# Patient Record
Sex: FEMALE | Race: OTHER | Employment: OTHER | URBAN - METROPOLITAN AREA
[De-identification: names, ages, dates, MRNs, and addresses within clinical notes are randomized per-mention and may not be internally consistent; named-entity substitution may affect disease eponyms.]

---

## 2017-01-18 ENCOUNTER — OFFICE VISIT (OUTPATIENT)
Dept: INTERNAL MEDICINE CLINIC | Facility: CLINIC | Age: 77
End: 2017-01-18

## 2017-01-18 VITALS
HEART RATE: 62 BPM | DIASTOLIC BLOOD PRESSURE: 72 MMHG | BODY MASS INDEX: 23.19 KG/M2 | SYSTOLIC BLOOD PRESSURE: 138 MMHG | WEIGHT: 126 LBS | RESPIRATION RATE: 16 BRPM | HEIGHT: 62 IN

## 2017-01-18 DIAGNOSIS — I10 ESSENTIAL HYPERTENSION WITH GOAL BLOOD PRESSURE LESS THAN 130/85: ICD-10-CM

## 2017-01-18 DIAGNOSIS — E11.9 TYPE 2 DIABETES MELLITUS WITHOUT COMPLICATION, WITHOUT LONG-TERM CURRENT USE OF INSULIN (HCC): Primary | ICD-10-CM

## 2017-01-18 DIAGNOSIS — E78.2 MIXED HYPERLIPIDEMIA: ICD-10-CM

## 2017-01-18 PROCEDURE — G0463 HOSPITAL OUTPT CLINIC VISIT: HCPCS | Performed by: INTERNAL MEDICINE

## 2017-01-18 PROCEDURE — 99214 OFFICE O/P EST MOD 30 MIN: CPT | Performed by: INTERNAL MEDICINE

## 2017-01-18 NOTE — PROGRESS NOTES
HPI:   1. Type 2 diabetes mellitus without complication, without long-term current use of insulin ([facility] [address]    Mary Bloom is a 78year old female who presents for recheck of her diabetes. Patient’s FBS have been good.  Last visit with ophthalmologist was 9/2 Value   09/26/2015 80   05/20/2014 66   08/22/2012 86   ----------  AST (U/L)   Date Value   10/09/2016 20   ----------  AST (SGOT) (P) (U/L)   Date Value   09/26/2015 17   05/20/2014 23   08/22/2012 21   ----------  ALT (U/L)   Date Value   10/09/2016 13* INNER EAR SURGERY PROC UNLISTED      Comment mastoidectomy      Social History:   Smoking Status: Former Smoker                   Packs/Day: 1.00  Years:           Types: Cigarettes      Quit date: 01/01/1985    Alcohol Use: Yes                Comment: 1 g exercise at least 3 times weekly will help to curb one's appetite, control weight and lead to better blood pressure control.     3. Mixed hyperlipidemia    Patient instructed to take cholesterol lowering medications as prescribed and to continue to follow a

## 2017-01-23 RX ORDER — DILTIAZEM HYDROCHLORIDE 240 MG/1
CAPSULE, EXTENDED RELEASE ORAL
Qty: 90 CAPSULE | Refills: 0 | Status: SHIPPED | OUTPATIENT
Start: 2017-01-23 | End: 2017-04-19

## 2017-01-23 RX ORDER — PRAVASTATIN SODIUM 20 MG
TABLET ORAL
Qty: 90 TABLET | Refills: 0 | Status: SHIPPED | OUTPATIENT
Start: 2017-01-23 | End: 2017-04-19

## 2017-01-23 RX ORDER — LISINOPRIL 20 MG/1
TABLET ORAL
Qty: 90 TABLET | Refills: 0 | Status: SHIPPED | OUTPATIENT
Start: 2017-01-23 | End: 2017-04-19

## 2017-01-23 RX ORDER — SITAGLIPTIN AND METFORMIN HYDROCHLORIDE 50; 1000 MG/1; MG/1
TABLET, FILM COATED ORAL
Qty: 180 TABLET | Refills: 0 | Status: SHIPPED | OUTPATIENT
Start: 2017-01-23 | End: 2017-04-19

## 2017-04-19 ENCOUNTER — OFFICE VISIT (OUTPATIENT)
Dept: INTERNAL MEDICINE CLINIC | Facility: CLINIC | Age: 77
End: 2017-04-19

## 2017-04-19 VITALS
WEIGHT: 125 LBS | BODY MASS INDEX: 23 KG/M2 | HEART RATE: 67 BPM | RESPIRATION RATE: 16 BRPM | SYSTOLIC BLOOD PRESSURE: 138 MMHG | HEIGHT: 62 IN | DIASTOLIC BLOOD PRESSURE: 66 MMHG

## 2017-04-19 DIAGNOSIS — E78.2 MIXED HYPERLIPIDEMIA: ICD-10-CM

## 2017-04-19 DIAGNOSIS — I10 ESSENTIAL HYPERTENSION WITH GOAL BLOOD PRESSURE LESS THAN 130/85: ICD-10-CM

## 2017-04-19 DIAGNOSIS — J43.2 CENTRILOBULAR EMPHYSEMA (HCC): ICD-10-CM

## 2017-04-19 DIAGNOSIS — E11.9 TYPE 2 DIABETES MELLITUS WITHOUT COMPLICATION, WITHOUT LONG-TERM CURRENT USE OF INSULIN (HCC): Primary | ICD-10-CM

## 2017-04-19 PROCEDURE — 99214 OFFICE O/P EST MOD 30 MIN: CPT | Performed by: INTERNAL MEDICINE

## 2017-04-19 PROCEDURE — G0463 HOSPITAL OUTPT CLINIC VISIT: HCPCS | Performed by: INTERNAL MEDICINE

## 2017-04-19 RX ORDER — LISINOPRIL 20 MG/1
20 TABLET ORAL
Qty: 90 TABLET | Refills: 3 | Status: ON HOLD | OUTPATIENT
Start: 2017-04-19 | End: 2018-04-13

## 2017-04-19 RX ORDER — PRAVASTATIN SODIUM 20 MG
20 TABLET ORAL NIGHTLY
Qty: 90 TABLET | Refills: 3 | Status: SHIPPED | OUTPATIENT
Start: 2017-04-19 | End: 2018-06-07

## 2017-04-19 RX ORDER — DILTIAZEM HYDROCHLORIDE 240 MG/1
240 CAPSULE, COATED, EXTENDED RELEASE ORAL
Qty: 90 CAPSULE | Refills: 3 | Status: SHIPPED | OUTPATIENT
Start: 2017-04-19 | End: 2018-05-04

## 2017-04-19 RX ORDER — GLIMEPIRIDE 2 MG/1
4 TABLET ORAL
Qty: 90 TABLET | Refills: 3 | Status: ON HOLD | OUTPATIENT
Start: 2017-04-19 | End: 2017-06-18

## 2017-04-19 NOTE — PROGRESS NOTES
HPI:   1. Type 2 diabetes mellitus without complication, without long-term current use of insulin ([facility] [address]    Rasheed Luna is a 78year old female who presents for recheck of her diabetes. Patient’s FBS have been good.  Last visit with ophthalmologist was 9/2 05/20/2014 66   08/22/2012 86   ----------  AST (U/L)   Date Value   10/09/2016 20   09/26/2015 17   05/20/2014 23   08/22/2012 21   ----------  ALT (U/L)   Date Value   10/09/2016 13*   09/26/2015 15   05/20/2014 16   08/22/2012 17   ----------   No res otherwise  SKIN: denies any unusual skin lesions or rashes  RESPIRATORY: denies shortness of breath with exertion  CARDIOVASCULAR: denies chest pain on exertion  GI: denies abdominal pain and denies heartburn  NEURO: denies headaches    EXAM:   /66 m weight. 4. Centrilobular emphysema ([address]    Has continued Chronic Obstructive Pulmonary Disease. (COPD). Coughs in AM most days. Has been using inhalers as directed and has been getting relief. Advised to rinse mouth after using any inhalers with steroid

## 2017-05-12 RX ORDER — FLUTICASONE PROPIONATE AND SALMETEROL 50; 250 UG/1; UG/1
POWDER RESPIRATORY (INHALATION)
Qty: 60 EACH | Refills: 2 | Status: ON HOLD | OUTPATIENT
Start: 2017-05-12 | End: 2018-04-16

## 2017-05-12 NOTE — TELEPHONE ENCOUNTER
Refill Protocol Appointment Criteria  · Appointment scheduled in the past 6 months or in the next 3 months  Recent Visits       Provider Department Primary Dx    3 weeks ago Aarti Hernandez MD [facility], [facility], [facility] [place] Type 2 diabetes [facility]

## 2017-06-18 ENCOUNTER — HOSPITAL ENCOUNTER (INPATIENT)
Facility: HOSPITAL | Age: 77
LOS: 2 days | Discharge: HOME | DRG: 190 | End: 2017-06-20
Attending: EMERGENCY MEDICINE | Admitting: HOSPITALIST
Payer: MEDICARE

## 2017-06-18 ENCOUNTER — APPOINTMENT (OUTPATIENT)
Dept: GENERAL RADIOLOGY | Facility: HOSPITAL | Age: 77
DRG: 190 | End: 2017-06-18
Attending: EMERGENCY MEDICINE
Payer: MEDICARE

## 2017-06-18 DIAGNOSIS — J45.901 ASTHMA EXACERBATION: ICD-10-CM

## 2017-06-18 DIAGNOSIS — R09.02 HYPOXIA: Primary | ICD-10-CM

## 2017-06-18 DIAGNOSIS — R77.8 ELEVATED TROPONIN: ICD-10-CM

## 2017-06-18 PROBLEM — R79.89 ELEVATED TROPONIN: Status: ACTIVE | Noted: 2017-06-18

## 2017-06-18 LAB
ANION GAP SERPL CALC-SCNC: 11 MMOL/L (ref 0–18)
BASOPHILS # BLD: 0 K/UL (ref 0–0.2)
BASOPHILS NFR BLD: 0 %
BUN SERPL-MCNC: 14 MG/DL (ref 8–20)
BUN/CREAT SERPL: 21.5 (ref 10–20)
CALCIUM SERPL-MCNC: 9.6 MG/DL (ref 8.5–10.5)
CHLORIDE SERPL-SCNC: 98 MMOL/L (ref 95–110)
CHOLEST SERPL-MCNC: 134 MG/DL (ref 110–200)
CO2 SERPL-SCNC: 29 MMOL/L (ref 22–32)
CREAT SERPL-MCNC: 0.65 MG/DL (ref 0.5–1.5)
EOSINOPHIL # BLD: 0 K/UL (ref 0–0.7)
EOSINOPHIL NFR BLD: 0 %
ERYTHROCYTE [DISTWIDTH] IN BLOOD BY AUTOMATED COUNT: 13.1 % (ref 11–15)
GLUCOSE BLDC GLUCOMTR-MCNC: 352 MG/DL (ref 70–99)
GLUCOSE SERPL-MCNC: 295 MG/DL (ref 70–99)
HCT VFR BLD AUTO: 40.3 % (ref 35–48)
HDLC SERPL-MCNC: 72 MG/DL
HGB BLD-MCNC: 13.4 G/DL (ref 12–16)
LDLC SERPL CALC-MCNC: 51 MG/DL (ref 0–99)
LYMPHOCYTES # BLD: 0.7 K/UL (ref 1–4)
LYMPHOCYTES NFR BLD: 5 %
MAGNESIUM SERPL-MCNC: 1.7 MG/DL (ref 1.8–2.5)
MCH RBC QN AUTO: 31.4 PG (ref 27–32)
MCHC RBC AUTO-ENTMCNC: 33.2 G/DL (ref 32–37)
MCV RBC AUTO: 94.5 FL (ref 80–100)
MONOCYTES # BLD: 0.9 K/UL (ref 0–1)
MONOCYTES NFR BLD: 7 %
NEUTROPHILS # BLD AUTO: 10.7 K/UL (ref 1.8–7.7)
NEUTROPHILS NFR BLD: 87 %
NONHDLC SERPL-MCNC: 62 MG/DL
OSMOLALITY UR CALC.SUM OF ELEC: 297 MOSM/KG (ref 275–295)
PLATELET # BLD AUTO: 169 K/UL (ref 140–400)
PMV BLD AUTO: 9.5 FL (ref 7.4–10.3)
POTASSIUM SERPL-SCNC: 4.2 MMOL/L (ref 3.3–5.1)
RBC # BLD AUTO: 4.27 M/UL (ref 3.7–5.4)
SODIUM SERPL-SCNC: 138 MMOL/L (ref 136–144)
TRIGL SERPL-MCNC: 56 MG/DL (ref 1–149)
TROPONIN I SERPL-MCNC: 0.09 NG/ML (ref ?–0.03)
TROPONIN I SERPL-MCNC: 0.1 NG/ML (ref ?–0.03)
WBC # BLD AUTO: 12.3 K/UL (ref 4–11)

## 2017-06-18 PROCEDURE — 71010 XR CHEST AP PORTABLE  (CPT=71010): CPT | Performed by: EMERGENCY MEDICINE

## 2017-06-18 PROCEDURE — 99223 1ST HOSP IP/OBS HIGH 75: CPT | Performed by: HOSPITALIST

## 2017-06-18 PROCEDURE — 99222 1ST HOSP IP/OBS MODERATE 55: CPT | Performed by: INTERNAL MEDICINE

## 2017-06-18 PROCEDURE — 3E0F73Z INTRODUCTION OF ANTI-INFLAMMATORY INTO RESPIRATORY TRACT, VIA NATURAL OR ARTIFICIAL OPENING: ICD-10-PCS | Performed by: HOSPITALIST

## 2017-06-18 RX ORDER — METHYLPREDNISOLONE SODIUM SUCCINATE 40 MG/ML
40 INJECTION, POWDER, LYOPHILIZED, FOR SOLUTION INTRAMUSCULAR; INTRAVENOUS ONCE
Status: COMPLETED | OUTPATIENT
Start: 2017-06-18 | End: 2017-06-18

## 2017-06-18 RX ORDER — METHYLPREDNISOLONE SODIUM SUCCINATE 40 MG/ML
40 INJECTION, POWDER, LYOPHILIZED, FOR SOLUTION INTRAMUSCULAR; INTRAVENOUS EVERY 8 HOURS
Status: DISCONTINUED | OUTPATIENT
Start: 2017-06-18 | End: 2017-06-19

## 2017-06-18 RX ORDER — ALBUTEROL SULFATE 2.5 MG/3ML
2.5 SOLUTION RESPIRATORY (INHALATION) CONTINUOUS
Status: DISCONTINUED | OUTPATIENT
Start: 2017-06-18 | End: 2017-06-18

## 2017-06-18 RX ORDER — SODIUM PHOSPHATE, DIBASIC AND SODIUM PHOSPHATE, MONOBASIC 7; 19 G/133ML; G/133ML
1 ENEMA RECTAL ONCE AS NEEDED
Status: DISCONTINUED | OUTPATIENT
Start: 2017-06-18 | End: 2017-06-20

## 2017-06-18 RX ORDER — DEXTROSE MONOHYDRATE 25 G/50ML
50 INJECTION, SOLUTION INTRAVENOUS AS NEEDED
Status: DISCONTINUED | OUTPATIENT
Start: 2017-06-18 | End: 2017-06-20

## 2017-06-18 RX ORDER — BISACODYL 10 MG
10 SUPPOSITORY, RECTAL RECTAL
Status: DISCONTINUED | OUTPATIENT
Start: 2017-06-18 | End: 2017-06-20

## 2017-06-18 RX ORDER — LISINOPRIL 20 MG/1
20 TABLET ORAL
Status: DISCONTINUED | OUTPATIENT
Start: 2017-06-18 | End: 2017-06-20

## 2017-06-18 RX ORDER — AZITHROMYCIN 250 MG/1
500 TABLET, FILM COATED ORAL DAILY
Status: DISCONTINUED | OUTPATIENT
Start: 2017-06-18 | End: 2017-06-20

## 2017-06-18 RX ORDER — IPRATROPIUM BROMIDE AND ALBUTEROL SULFATE 2.5; .5 MG/3ML; MG/3ML
SOLUTION RESPIRATORY (INHALATION)
Status: DISCONTINUED
Start: 2017-06-18 | End: 2017-06-18 | Stop reason: WASHOUT

## 2017-06-18 RX ORDER — ALBUTEROL SULFATE 2.5 MG/3ML
10 SOLUTION RESPIRATORY (INHALATION) CONTINUOUS
Status: DISCONTINUED | OUTPATIENT
Start: 2017-06-18 | End: 2017-06-20

## 2017-06-18 RX ORDER — DILTIAZEM HYDROCHLORIDE 240 MG/1
240 CAPSULE, COATED, EXTENDED RELEASE ORAL
Status: DISCONTINUED | OUTPATIENT
Start: 2017-06-18 | End: 2017-06-20

## 2017-06-18 RX ORDER — POLYETHYLENE GLYCOL 3350 17 G/17G
17 POWDER, FOR SOLUTION ORAL DAILY PRN
Status: DISCONTINUED | OUTPATIENT
Start: 2017-06-18 | End: 2017-06-20

## 2017-06-18 RX ORDER — ASPIRIN 81 MG/1
81 TABLET ORAL DAILY
Status: DISCONTINUED | OUTPATIENT
Start: 2017-06-18 | End: 2017-06-20

## 2017-06-18 RX ORDER — IPRATROPIUM BROMIDE AND ALBUTEROL SULFATE 2.5; .5 MG/3ML; MG/3ML
3 SOLUTION RESPIRATORY (INHALATION) ONCE
Status: COMPLETED | OUTPATIENT
Start: 2017-06-18 | End: 2017-06-18

## 2017-06-18 RX ORDER — ENOXAPARIN SODIUM 100 MG/ML
40 INJECTION SUBCUTANEOUS DAILY
Status: DISCONTINUED | OUTPATIENT
Start: 2017-06-19 | End: 2017-06-20

## 2017-06-18 RX ORDER — ASPIRIN 81 MG/1
324 TABLET, CHEWABLE ORAL ONCE
Status: COMPLETED | OUTPATIENT
Start: 2017-06-18 | End: 2017-06-18

## 2017-06-18 RX ORDER — ACETAMINOPHEN 325 MG/1
650 TABLET ORAL EVERY 6 HOURS PRN
Status: DISCONTINUED | OUTPATIENT
Start: 2017-06-18 | End: 2017-06-20

## 2017-06-18 RX ORDER — IPRATROPIUM BROMIDE AND ALBUTEROL SULFATE 2.5; .5 MG/3ML; MG/3ML
3 SOLUTION RESPIRATORY (INHALATION)
Status: DISCONTINUED | OUTPATIENT
Start: 2017-06-18 | End: 2017-06-19

## 2017-06-18 RX ORDER — PRAVASTATIN SODIUM 20 MG
20 TABLET ORAL NIGHTLY
Status: DISCONTINUED | OUTPATIENT
Start: 2017-06-18 | End: 2017-06-20

## 2017-06-18 NOTE — CONSULTS
[facility]  Report of Consultation    Sarah Eaton Patient Status:  Emergency    1940 MRN [id number]   Location [address] Attending Tanika Blackwell MD   Hosp Day # 0 PCP Tali Bautista MD     Reason fo quit over 30 years ago. Retired pediatric RN. , lives with a cousin. Allergies:    Peanuts                     Comment:Other reaction(s):  Anaphylaxis  Penicillins                 Comment:Other reaction(s): PENICILLINS    Medications:    Juan Jose Evans HGB 13.4 06/18/2017   HCT 40.3 06/18/2017   MCV 94.5 06/18/2017   MCH 31.4 06/18/2017   MCHC 33.2 06/18/2017   RDW 13.1 06/18/2017    06/18/2017   MPV 9.5 06/18/2017     No results found for: PT, INR     . BMP normal.  Troponin 0.09.   LDL

## 2017-06-18 NOTE — ED NOTES
Respiratory called for duoneb. IV established to right [facility], #20 by Avila Bullock. 4 L 02 applied. Xray ready.

## 2017-06-18 NOTE — ED PROVIDER NOTES
Patient Seen in: [facility] AND [facility] Emergency Department    History   Patient presents with:  Dyspnea IKER SOB (respiratory)    Stated Complaint: SOB     HPI    79 yo F with PMH asthma/COPD, DM, HTN, HL presenting for evaluation of one day of worsening exert Problem Relation Age of Onset   • Heart Disease Father      CAD (cause of death at age 77)   • Infectious Disease Mother      sepsis (cause of death at age 76)         Smoking Status: Former Smoker                   Packs/Day: 1.00  Years:           Type other components within normal limits   MAGNESIUM - Abnormal; Notable for the following:     Magnesium 1.7 (*)     All other components within normal limits   TROPONIN I, 0 HOUR - Abnormal; Notable for the following:     Troponin 0.09 (*)     All other com LUNGS/PLEURA: Pulmonary hyperinflation with emphysematous changes in the upper lobes. No focal pulmonary opacity or pleural effusion. BONES: No fracture or visible bony lesion. OTHER: Negative.    Dictated by (CST): Adwoa Palomino MD on 6/18/2017 at 16:31

## 2017-06-18 NOTE — ED NOTES
Report given to [facility] 3rd floor. ED tech at bedside for 2 hour EKG and troponin. Transport requested.

## 2017-06-18 NOTE — H&P
[address] [place] [address],4[address] Floor Patient Status:  Emergency    1940 MRN [id number]   Location [address] Attending Tula Krabbe, MD   Hosp Day # 0 PCP Nahed Narvaez MD     Jaxon Comment:Other reaction(s): PENICILLINS    (Not in a hospital admission)    Review of Systems:   Pertinent items are noted in HPI. CONSTITUTIONAL:  No weight loss, fever, chills, weakness or fatigue.   HEENT:  Eyes:  No visual loss, blurred vision, double v speech clear and coherent. Psychiatric:  Cooperative, appropriate mood & affect.     Cervical Papanicolaou to be done in MD's office    Results:     Lab Results  Component Value Date   WBC 12.3* 06/18/2017   HGB 13.4 06/18/2017   HCT 40.3 06/18/2017   PLT

## 2017-06-19 ENCOUNTER — APPOINTMENT (OUTPATIENT)
Dept: CV DIAGNOSTICS | Facility: HOSPITAL | Age: 77
DRG: 190 | End: 2017-06-19
Attending: HOSPITALIST
Payer: MEDICARE

## 2017-06-19 LAB
ANION GAP SERPL CALC-SCNC: 9 MMOL/L (ref 0–18)
BASOPHILS # BLD: 0 K/UL (ref 0–0.2)
BASOPHILS NFR BLD: 0 %
BUN SERPL-MCNC: 14 MG/DL (ref 8–20)
BUN/CREAT SERPL: 40 (ref 10–20)
CALCIUM SERPL-MCNC: 9.5 MG/DL (ref 8.5–10.5)
CHLORIDE SERPL-SCNC: 102 MMOL/L (ref 95–110)
CO2 SERPL-SCNC: 31 MMOL/L (ref 22–32)
CREAT SERPL-MCNC: 0.35 MG/DL (ref 0.5–1.5)
EOSINOPHIL # BLD: 0 K/UL (ref 0–0.7)
EOSINOPHIL NFR BLD: 0 %
ERYTHROCYTE [DISTWIDTH] IN BLOOD BY AUTOMATED COUNT: 13 % (ref 11–15)
GLUCOSE BLDC GLUCOMTR-MCNC: 136 MG/DL (ref 70–99)
GLUCOSE BLDC GLUCOMTR-MCNC: 138 MG/DL (ref 70–99)
GLUCOSE BLDC GLUCOMTR-MCNC: 277 MG/DL (ref 70–99)
GLUCOSE SERPL-MCNC: 171 MG/DL (ref 70–99)
HBA1C MFR BLD: 6.8 % (ref 4–6)
HCT VFR BLD AUTO: 36.8 % (ref 35–48)
HGB BLD-MCNC: 12.6 G/DL (ref 12–16)
LYMPHOCYTES # BLD: 0.6 K/UL (ref 1–4)
LYMPHOCYTES NFR BLD: 7 %
MAGNESIUM SERPL-MCNC: 1.8 MG/DL (ref 1.8–2.5)
MCH RBC QN AUTO: 32.1 PG (ref 27–32)
MCHC RBC AUTO-ENTMCNC: 34.4 G/DL (ref 32–37)
MCV RBC AUTO: 93.3 FL (ref 80–100)
MONOCYTES # BLD: 0.5 K/UL (ref 0–1)
MONOCYTES NFR BLD: 6 %
NEUTROPHILS # BLD AUTO: 7.2 K/UL (ref 1.8–7.7)
NEUTROPHILS NFR BLD: 86 %
OSMOLALITY UR CALC.SUM OF ELEC: 299 MOSM/KG (ref 275–295)
PLATELET # BLD AUTO: 168 K/UL (ref 140–400)
PMV BLD AUTO: 9.1 FL (ref 7.4–10.3)
POTASSIUM SERPL-SCNC: 4.2 MMOL/L (ref 3.3–5.1)
RBC # BLD AUTO: 3.94 M/UL (ref 3.7–5.4)
RESPIRATORY PANEL NEG:: NEGATIVE
SODIUM SERPL-SCNC: 142 MMOL/L (ref 136–144)
STREP PNEUMO ANTIGEN, URINE: NEGATIVE
TROPONIN I SERPL-MCNC: 0.06 NG/ML (ref ?–0.03)
WBC # BLD AUTO: 8.3 K/UL (ref 4–11)

## 2017-06-19 PROCEDURE — B24BZZZ ULTRASONOGRAPHY OF HEART WITH AORTA: ICD-10-PCS | Performed by: HOSPITALIST

## 2017-06-19 PROCEDURE — 93306 TTE W/DOPPLER COMPLETE: CPT | Performed by: HOSPITALIST

## 2017-06-19 PROCEDURE — 99232 SBSQ HOSP IP/OBS MODERATE 35: CPT | Performed by: INTERNAL MEDICINE

## 2017-06-19 PROCEDURE — 99233 SBSQ HOSP IP/OBS HIGH 50: CPT | Performed by: HOSPITALIST

## 2017-06-19 RX ORDER — METHYLPREDNISOLONE SODIUM SUCCINATE 40 MG/ML
40 INJECTION, POWDER, LYOPHILIZED, FOR SOLUTION INTRAMUSCULAR; INTRAVENOUS EVERY 12 HOURS
Status: COMPLETED | OUTPATIENT
Start: 2017-06-19 | End: 2017-06-19

## 2017-06-19 RX ORDER — PREDNISONE 20 MG/1
40 TABLET ORAL
Status: DISCONTINUED | OUTPATIENT
Start: 2017-06-20 | End: 2017-06-20

## 2017-06-19 RX ORDER — 0.9 % SODIUM CHLORIDE 0.9 %
VIAL (ML) INJECTION
Status: COMPLETED
Start: 2017-06-19 | End: 2017-06-19

## 2017-06-19 RX ORDER — MAGNESIUM OXIDE 400 MG (241.3 MG MAGNESIUM) TABLET
400 TABLET ONCE
Status: COMPLETED | OUTPATIENT
Start: 2017-06-19 | End: 2017-06-19

## 2017-06-19 RX ORDER — IPRATROPIUM BROMIDE AND ALBUTEROL SULFATE 2.5; .5 MG/3ML; MG/3ML
3 SOLUTION RESPIRATORY (INHALATION)
Status: DISCONTINUED | OUTPATIENT
Start: 2017-06-19 | End: 2017-06-20

## 2017-06-19 NOTE — PROGRESS NOTES
[facility]    Progress Note    [facility] Patient Status:  Inpatient    1940 MRN [id number]   Location [facility] 3W/SW Attending Madhav Sorto MD   Hosp Day # 1 PCP Lucille Roth MD         Assessment and Pl Subcutaneous TID CC   • insulin aspart  1-7 Units Subcutaneous TID CC   • aspirin EC  81 mg Oral Daily   • azithromycin  500 mg Oral Daily   • DilTIAZem HCl ER Coated Beads  240 mg Oral Daily   • lisinopril  20 mg Oral Daily   • Pravastatin Sodium  20 mg O

## 2017-06-19 NOTE — CONSULTS
Pulmonary/Critical Care Consult Note    HPI:   Didier Velazco is a 78year old female with Patient presents with:  Dyspnea IKER SOB (respiratory)    Donnell Tello MD    Pt is a 77 yo with asthma, COPD, remote tob abuse, HL, and HTN who presented to 40 mg 40 mg Subcutaneous Daily   acetaminophen (TYLENOL) tab 650 mg 650 mg Oral Q6H PRN   ipratropium-albuterol (DUONEB) nebulizer solution 3 mL 3 mL Nebulization Q6H WA   MethylPREDNISolone Sodium Succ (Solu-MEDROL) injection 40 mg 40 mg Intravenous Q8H (cause of death at age 78)            PHYSICAL EXAM:   /59 mmHg  Pulse 106  Temp(Src) 98.6 °F (37 °C) (Oral)  Resp 20  Ht 5' 2\" (1.575 m)  Wt 121 lb 3.2 oz (54.976 kg)  BMI 22.16 kg/m2  SpO2 92%    Intake/Output Summary (Last 24 hours) at 06/19/17 0

## 2017-06-19 NOTE — PROGRESS NOTES
Pulmonary/Critical Care Follow Up Note    HPI:   Daisha Leblanc is a 78year old female with Patient presents with:  Dyspnea IKER SOB (respiratory)      PCP Helane Lundborg, MD  Admission Attending Lawson Prieto MD    Hospital Day #1    Feeling beter Units, Subcutaneous, TID CC  •  aspirin EC tab 81 mg, 81 mg, Oral, Daily  •  azithromycin (ZITHROMAX) tab 500 mg, 500 mg, Oral, Daily  •  DilTIAZem HCl ER Coated Beads (CARDIZEM CD) 24 hr cap 240 mg, 240 mg, Oral, Daily  •  lisinopril (PRINIVIL,ZESTRIL) ta

## 2017-06-19 NOTE — PROGRESS NOTES
[facility]    Progress Note    [facility] Patient Status:  Inpatient    1940 MRN [id number]   Location [facility] 3W/SW Attending Madhav Sorto MD   Hosp Day # 1 PCP Chante Horner MD     Subjective:   J[place]anny [place] proph: heparin sq    Total time spent with >50% on patient counseling, including chart review >30 min. Dispo:   Respiratory status improved,   Appreciate recs   Will continue to monitor.        Results:     Lab Results  Component Value Date   WBC 8.3 06

## 2017-06-19 NOTE — PROGRESS NOTES
Zithromax (azithromycin) 250 mg PO q24h was ordered for Rachel Stover by Dr. Marielena Marquez. Body mass index is 24.69 kg/(m^2).   Wt Readings from Last 6 Encounters:  06/18/17 : 135 lb  04/19/17 : 125 lb  01/18/17 : 126 lb  10/10/16 : 125 lb  07/11/16 : 125 lb

## 2017-06-20 ENCOUNTER — APPOINTMENT (OUTPATIENT)
Dept: NUCLEAR MEDICINE | Facility: HOSPITAL | Age: 77
DRG: 190 | End: 2017-06-20
Attending: NURSE PRACTITIONER
Payer: MEDICARE

## 2017-06-20 ENCOUNTER — APPOINTMENT (OUTPATIENT)
Dept: CV DIAGNOSTICS | Facility: HOSPITAL | Age: 77
DRG: 190 | End: 2017-06-20
Attending: NURSE PRACTITIONER
Payer: MEDICARE

## 2017-06-20 VITALS
BODY MASS INDEX: 22.23 KG/M2 | WEIGHT: 120.81 LBS | SYSTOLIC BLOOD PRESSURE: 119 MMHG | DIASTOLIC BLOOD PRESSURE: 45 MMHG | OXYGEN SATURATION: 93 % | TEMPERATURE: 98 F | RESPIRATION RATE: 18 BRPM | HEART RATE: 60 BPM | HEIGHT: 62 IN

## 2017-06-20 LAB
ANION GAP SERPL CALC-SCNC: 7 MMOL/L (ref 0–18)
BASOPHILS # BLD: 0 K/UL (ref 0–0.2)
BASOPHILS NFR BLD: 0 %
BUN SERPL-MCNC: 38 MG/DL (ref 8–20)
BUN/CREAT SERPL: 52.8 (ref 10–20)
CALCIUM SERPL-MCNC: 9.3 MG/DL (ref 8.5–10.5)
CHLORIDE SERPL-SCNC: 100 MMOL/L (ref 95–110)
CO2 SERPL-SCNC: 30 MMOL/L (ref 22–32)
CREAT SERPL-MCNC: 0.72 MG/DL (ref 0.5–1.5)
EOSINOPHIL # BLD: 0 K/UL (ref 0–0.7)
EOSINOPHIL NFR BLD: 0 %
ERYTHROCYTE [DISTWIDTH] IN BLOOD BY AUTOMATED COUNT: 12.8 % (ref 11–15)
GLUCOSE BLDC GLUCOMTR-MCNC: 208 MG/DL (ref 70–99)
GLUCOSE BLDC GLUCOMTR-MCNC: 219 MG/DL (ref 70–99)
GLUCOSE BLDC GLUCOMTR-MCNC: 281 MG/DL (ref 70–99)
GLUCOSE SERPL-MCNC: 253 MG/DL (ref 70–99)
HCT VFR BLD AUTO: 34.5 % (ref 35–48)
HGB BLD-MCNC: 11.7 G/DL (ref 12–16)
LYMPHOCYTES # BLD: 0.5 K/UL (ref 1–4)
LYMPHOCYTES NFR BLD: 5 %
MAGNESIUM SERPL-MCNC: 2.2 MG/DL (ref 1.8–2.5)
MCH RBC QN AUTO: 32.1 PG (ref 27–32)
MCHC RBC AUTO-ENTMCNC: 33.9 G/DL (ref 32–37)
MCV RBC AUTO: 94.5 FL (ref 80–100)
MONOCYTES # BLD: 0.2 K/UL (ref 0–1)
MONOCYTES NFR BLD: 2 %
NEUTROPHILS # BLD AUTO: 9.4 K/UL (ref 1.8–7.7)
NEUTROPHILS NFR BLD: 93 %
OSMOLALITY UR CALC.SUM OF ELEC: 302 MOSM/KG (ref 275–295)
PHOSPHATE SERPL-MCNC: 2.8 MG/DL (ref 2.4–4.7)
PLATELET # BLD AUTO: 180 K/UL (ref 140–400)
PMV BLD AUTO: 9.6 FL (ref 7.4–10.3)
POTASSIUM SERPL-SCNC: 4.7 MMOL/L (ref 3.3–5.1)
PROCALCITONIN SERPL-MCNC: 0.11 NG/ML (ref ?–0.11)
RBC # BLD AUTO: 3.65 M/UL (ref 3.7–5.4)
SODIUM SERPL-SCNC: 137 MMOL/L (ref 136–144)
WBC # BLD AUTO: 10.1 K/UL (ref 4–11)

## 2017-06-20 PROCEDURE — 99239 HOSP IP/OBS DSCHRG MGMT >30: CPT | Performed by: HOSPITALIST

## 2017-06-20 PROCEDURE — 78452 HT MUSCLE IMAGE SPECT MULT: CPT | Performed by: NURSE PRACTITIONER

## 2017-06-20 PROCEDURE — 3E033HZ INTRODUCTION OF RADIOACTIVE SUBSTANCE INTO PERIPHERAL VEIN, PERCUTANEOUS APPROACH: ICD-10-PCS | Performed by: HOSPITALIST

## 2017-06-20 PROCEDURE — 4A02XM4 MEASUREMENT OF CARDIAC TOTAL ACTIVITY, EXTERNAL APPROACH: ICD-10-PCS | Performed by: HOSPITALIST

## 2017-06-20 PROCEDURE — 99232 SBSQ HOSP IP/OBS MODERATE 35: CPT | Performed by: INTERNAL MEDICINE

## 2017-06-20 PROCEDURE — 93017 CV STRESS TEST TRACING ONLY: CPT | Performed by: NURSE PRACTITIONER

## 2017-06-20 RX ORDER — ASPIRIN 81 MG/1
81 TABLET ORAL DAILY
Qty: 30 TABLET | Refills: 0 | Status: SHIPPED | OUTPATIENT
Start: 2017-06-20 | End: 2017-07-20

## 2017-06-20 RX ORDER — AZITHROMYCIN 250 MG/1
250 TABLET, FILM COATED ORAL DAILY
Qty: 3 TABLET | Refills: 0 | Status: SHIPPED | OUTPATIENT
Start: 2017-06-20 | End: 2017-06-23

## 2017-06-20 RX ORDER — 0.9 % SODIUM CHLORIDE 0.9 %
VIAL (ML) INJECTION
Status: COMPLETED
Start: 2017-06-20 | End: 2017-06-20

## 2017-06-20 RX ORDER — PREDNISONE 10 MG/1
TABLET ORAL
Qty: 15 TABLET | Refills: 0 | Status: SHIPPED | OUTPATIENT
Start: 2017-06-20 | End: 2017-07-06

## 2017-06-20 NOTE — CM/SW NOTE
Patient will need home O2. O2 saturations documented, still need signed MD order. Charlotte Luis, [address] [place] [address] ([phone]) notified.       **Need signed O2 order form to process oxygen referralEleno Priest RN, [facility] [id number]     6256 Signed order in chart    Jasen Shahid RN,

## 2017-06-20 NOTE — DISCHARGE PLANNING
6/20CM- MD orders received in regards to discharge planning. The Patient was seen at bedside. The Patient resides alone in in [place] in a single family home.  Prior to hospitalization, the patient was driving,  doing grocery shopping, errands, and is inde

## 2017-06-20 NOTE — PLAN OF CARE
SPO2 on Room Air at Rest: 95%  SPO2 Ambulation on Room Air: 85%  SPO2 Ambulation on Oxygen: 92%  Ambulation oxygen flow (liters per minute): 2.

## 2017-06-20 NOTE — PROGRESS NOTES
[address] [facility] Cardiology  Progress Note    George Sheehan Patient Status:  Inpatient    1940 MRN [id number]   Location [facility] 3W/SW Attending Italo Chino MD   Hosp Day # 2 PCP Felipe Mayen 10/09/2016   BILT 0.6 10/09/2016   TP 7.2 10/09/2016   AST 20 10/09/2016   ALT 13* 10/09/2016   TSH 3.68 10/09/2016   MG 2.2 06/20/2017   PHOS 2.8 06/20/2017   TROP 0.06* 06/19/2017       Wt Readings from Last 4 Encounters:  06/20/17 : 120 lb 12.8 oz (54.7

## 2017-06-20 NOTE — PROGRESS NOTES
[facility]     Progress Note        Kamaljit Mo Patient Status:  Inpatient    1940 MRN [id number]   Location [facility] 3W/SW Attending Sarah Zuniga MD   [address] Day # 2 PCP Yesi Garcia MD       Subjective:   Pa TID CC   insulin aspart (NOVOLOG) 100 UNIT/ML flexpen 1-7 Units 1-7 Units Subcutaneous TID CC   aspirin EC tab 81 mg 81 mg Oral Daily   azithromycin (ZITHROMAX) tab 500 mg 500 mg Oral Daily   DilTIAZem HCl ER Coated Beads (CARDIZEM CD) 24 hr cap 240 mg 240 1. COPD with acute exacerbation  2. Hypertension  3.   Elevated troponin     Plan   -Gradual improvement from COPD standpoint  -Gradually wean prednisone therapy  -ICS/L KEKE  -Nebulizer treatments  -Activity as tolerated  -Okay for discharge from pulmon

## 2017-06-20 NOTE — DISCHARGE SUMMARY
[facility]    Discharge Summary    Feliberto Merrill Patient Status:  Inpatient    1940 MRN [id number]   Location [facility] 3W/SW Attending Demi Begum MD   [facility] Day # 2 PCP Renae Garland MD     Date of Admission worsening dyspnea on exertion.  She states her symptoms began yesterday and progressively worsening.  No chest pain.  No associated n/v/abd pain.  No diaphoresis.  No fevers/chills.  In the ED she was found to be hypoxic to 78% on room air.  Troponin was fo tablet   Refills:  0         CHANGE how you take these medications       Instructions Prescription details    DilTIAZem HCl ER Coated Beads 240 MG Cp24   Last time this was given:  240 mg on 6/20/2017  8:05 AM   Commonly known as:  DU SARMIENTO   What changed

## 2017-06-21 ENCOUNTER — TELEPHONE (OUTPATIENT)
Dept: CARDIOLOGY UNIT | Facility: HOSPITAL | Age: 77
End: 2017-06-21

## 2017-06-21 ENCOUNTER — PATIENT OUTREACH (OUTPATIENT)
Dept: FAMILY MEDICINE CLINIC | Facility: CLINIC | Age: 77
End: 2017-06-21

## 2017-06-21 NOTE — PROGRESS NOTES
Initial Post Discharge Follow Up   Discharge Date: 6/20/17  Contact Date: 6/21/2017    Consent Verification:  Assessment Completed With: Patient  HIPAA Verified? Yes    1.  Tell me why you were in the hospital?  \" Low oxygenation and beginning of bronchit morning. I take that with my other blood pressure med? .\" I infomed pt that the MD ordered a change in the timing of her medication of that one. She used to take it at night.  I informed her that she was on it in the morning in the hospital along with her o predisone until completed. She said she feels \" SO much better with this drug and said \"this drug is a miracle. \"  She is aware of when to call MD for exacerbation and knows the sooner to call the better for her to avoid a possible rehospitalization.   Pt experiencing a gradual worsening of your COPD symptoms, what instructions were you provided in regards to when to contact your physician? \" To call sooner, rather than later. \"    What are the following potential risk factors in your environment?  (Check A

## 2017-06-21 NOTE — PLAN OF CARE
CARDIOVASCULAR - ADULT    • Maintains optimal cardiac output and hemodynamic stability Completed    • Absence of cardiac arrhythmias or at baseline Completed        Patient/Family Goals    • Patient/Family Long Term Goal Completed    • Patient/Family Short

## 2017-06-22 ENCOUNTER — TELEPHONE (OUTPATIENT)
Dept: INTERNAL MEDICINE CLINIC | Facility: CLINIC | Age: 77
End: 2017-06-22

## 2017-06-22 ENCOUNTER — OFFICE VISIT (OUTPATIENT)
Dept: INTERNAL MEDICINE CLINIC | Facility: CLINIC | Age: 77
End: 2017-06-22

## 2017-06-22 ENCOUNTER — TELEPHONE (OUTPATIENT)
Dept: CARDIOLOGY UNIT | Facility: HOSPITAL | Age: 77
End: 2017-06-22

## 2017-06-22 VITALS
DIASTOLIC BLOOD PRESSURE: 68 MMHG | RESPIRATION RATE: 16 BRPM | BODY MASS INDEX: 22.63 KG/M2 | WEIGHT: 123 LBS | HEART RATE: 69 BPM | HEIGHT: 62 IN | SYSTOLIC BLOOD PRESSURE: 124 MMHG

## 2017-06-22 DIAGNOSIS — J44.0 CHRONIC OBSTRUCTIVE PULMONARY DISEASE WITH ACUTE LOWER RESPIRATORY INFECTION (HCC): Primary | ICD-10-CM

## 2017-06-22 DIAGNOSIS — E11.9 TYPE 2 DIABETES MELLITUS WITHOUT COMPLICATION, WITHOUT LONG-TERM CURRENT USE OF INSULIN (HCC): ICD-10-CM

## 2017-06-22 PROCEDURE — 99495 TRANSJ CARE MGMT MOD F2F 14D: CPT | Performed by: INTERNAL MEDICINE

## 2017-06-22 RX ORDER — PREDNISONE 10 MG/1
TABLET ORAL
Qty: 20 TABLET | Refills: 0 | Status: SHIPPED | OUTPATIENT
Start: 2017-06-22 | End: 2017-07-06

## 2017-06-22 NOTE — PROGRESS NOTES
HPI:    Mik Vizcaino is a 78year old female here today for hospital follow up.    She was discharged from Inpatient hospital[facility]  to Home   Admission Date: 6/18/17   Discharge Date: 6/20/17  Hospital Discharge Diagnosis: Chronic Obstruc mouth for 3 days. azithromycin 250 MG Oral Tab Take 1 tablet (250 mg total) by mouth daily.    ADVAIR DISKUS 250-50 MCG/DOSE Inhalation Aerosol Powder, Breath Activated INHALE 1 PUFF BY MOUTH INTO THE LUNGS EVERY 12 HOURS   lisinopril 20 MG Oral Tab Take diarrhea  MUSCULOSKELETAL: denies pain, normal range of motion of extremities  NEURO: denies headaches, denies dizziness, denies weakness  PSYCHE: denies depression or anxiety  HEMATOLOGIC: denies hx of anemia, or bruising, denies bleeding  ENDOCRINE: brea diabetes. Diabetic control is good but sugars higher on steroids. .  Recommendations are: continue present meds, follow HgbA1C,  lose wgt with carbohydrate controlled diet and exercise, refer to Ophthalmology, check feet daily.     Other orders  -     predni

## 2017-06-22 NOTE — TELEPHONE ENCOUNTER
Pt was seen by Dr Gerson Michael today and he wants her to be seen by a pulmonologist. I booked an appt with Dr Temo Chacko for a Aug1 but daughter would like her to be seen sooner,if possible, by anyone seeing as she was just in the ER.  Pt daughter Brenton Green would [employer]

## 2017-06-22 NOTE — TELEPHONE ENCOUNTER
Daughter states pt was seen by Dr. Alexander Pastor in the hospital.  Daughter states Dr. Carlos Diana is requesting pt to be seen ASAP. Daughter states pts breathing is not worse since being discharged but it is not getting better.   Daughter notified message will be s

## 2017-06-23 ENCOUNTER — TELEPHONE (OUTPATIENT)
Dept: CARDIOLOGY UNIT | Facility: HOSPITAL | Age: 77
End: 2017-06-23

## 2017-06-26 NOTE — TELEPHONE ENCOUNTER
Dawson,    I will find a spot for her. If pt has f/u with PCP or COPD clinic and doing well then I do not usally see the pt for 4 weeks after discharge.  Often pt have seen their PCP and COPD clinic/hosp f/u clinic and then I find I do not have a lot to ad

## 2017-07-06 ENCOUNTER — OFFICE VISIT (OUTPATIENT)
Dept: PULMONOLOGY | Facility: CLINIC | Age: 77
End: 2017-07-06

## 2017-07-06 VITALS
SYSTOLIC BLOOD PRESSURE: 112 MMHG | DIASTOLIC BLOOD PRESSURE: 54 MMHG | HEART RATE: 60 BPM | BODY MASS INDEX: 23 KG/M2 | RESPIRATION RATE: 28 BRPM | WEIGHT: 125 LBS | HEIGHT: 62 IN | OXYGEN SATURATION: 94 %

## 2017-07-06 DIAGNOSIS — J30.9 ALLERGIC RHINITIS, UNSPECIFIED ALLERGIC RHINITIS TRIGGER, UNSPECIFIED RHINITIS SEASONALITY: ICD-10-CM

## 2017-07-06 DIAGNOSIS — R09.02 HYPOXEMIA: ICD-10-CM

## 2017-07-06 DIAGNOSIS — J43.2 CENTRILOBULAR EMPHYSEMA (HCC): Primary | ICD-10-CM

## 2017-07-06 PROBLEM — J45.901 ASTHMA EXACERBATION: Status: RESOLVED | Noted: 2017-06-18 | Resolved: 2017-07-06

## 2017-07-06 PROCEDURE — G0463 HOSPITAL OUTPT CLINIC VISIT: HCPCS | Performed by: INTERNAL MEDICINE

## 2017-07-06 PROCEDURE — 99214 OFFICE O/P EST MOD 30 MIN: CPT | Performed by: INTERNAL MEDICINE

## 2017-07-06 PROCEDURE — 94761 N-INVAS EAR/PLS OXIMETRY MLT: CPT | Performed by: INTERNAL MEDICINE

## 2017-07-06 RX ORDER — ALBUTEROL SULFATE 90 UG/1
2 AEROSOL, METERED RESPIRATORY (INHALATION) EVERY 4 HOURS PRN
Qty: 1 INHALER | Refills: 6 | Status: SHIPPED | OUTPATIENT
Start: 2017-07-06 | End: 2020-12-09

## 2017-07-06 NOTE — PATIENT INSTRUCTIONS
If you feel short of breath or chest tightness or cough, you can use your albuterol inhaler 4 times per day As Needed. Please remember to get your influenza vaccine in the fall.     You are to wear your oxygen at 2L via nasal cannula with exercise and at

## 2017-07-06 NOTE — PROGRESS NOTES
Pulmonary Follow Up Note    HPI:   José Luis Louis is a 78year old female with Patient presents with:   Follow - Up: yR Roy MD    Recent AECOPD  Now doing well or at least \"better than hospital\"  + AR    More rhin 108 (90 BASE) MCG/ACT Inhalation Aero Soln Inhale 2 puffs into the lungs every 4 (four) hours as needed for Wheezing. Disp: 1 Inhaler Rfl: 6         Allergies:    Peanuts                     Comment:Other reaction(s):  Anaphylaxis  Penicillins

## 2017-07-19 ENCOUNTER — HOSPITAL ENCOUNTER (OUTPATIENT)
Dept: RESPIRATORY THERAPY | Facility: HOSPITAL | Age: 77
Discharge: HOME | End: 2017-07-19
Attending: INTERNAL MEDICINE
Payer: MEDICARE

## 2017-07-19 DIAGNOSIS — J43.2 CENTRILOBULAR EMPHYSEMA (HCC): ICD-10-CM

## 2017-07-19 PROCEDURE — 94726 PLETHYSMOGRAPHY LUNG VOLUMES: CPT | Performed by: INTERNAL MEDICINE

## 2017-07-19 PROCEDURE — 94729 DIFFUSING CAPACITY: CPT | Performed by: INTERNAL MEDICINE

## 2017-07-19 PROCEDURE — 94060 EVALUATION OF WHEEZING: CPT | Performed by: INTERNAL MEDICINE

## 2017-07-26 NOTE — PROCEDURES
[facility]    [facility] Interpretation    Meli Palomares    TAPAN 1940 MRN [id number]   Height   Age 78year old   Weight   Sex Female         Spirometry:   FEV1 0.55 L which is only 28%  FEV1/FVC ratio 47%  Significant bronchodilator respo

## 2017-07-26 NOTE — ADDENDUM NOTE
Encounter addended by: Art Avila MD on: 7/25/2017  8:35 PM<BR>    Actions taken: Sign clinical note, Charge Capture section accepted

## 2017-07-27 ENCOUNTER — TELEPHONE (OUTPATIENT)
Dept: PULMONOLOGY | Facility: CLINIC | Age: 77
End: 2017-07-27

## 2017-07-27 NOTE — TELEPHONE ENCOUNTER
----- Message from Fayetta Ganser, MD sent at 7/27/2017  1:10 PM CDT -----  Can you let her know her PFTs were c/w severe COPD?   She is on all the right medicines at this time  thx

## 2017-08-02 ENCOUNTER — OFFICE VISIT (OUTPATIENT)
Dept: INTERNAL MEDICINE CLINIC | Facility: CLINIC | Age: 77
End: 2017-08-02

## 2017-08-02 VITALS
WEIGHT: 123 LBS | HEART RATE: 56 BPM | SYSTOLIC BLOOD PRESSURE: 135 MMHG | DIASTOLIC BLOOD PRESSURE: 60 MMHG | RESPIRATION RATE: 16 BRPM | BODY MASS INDEX: 23 KG/M2

## 2017-08-02 DIAGNOSIS — I10 ESSENTIAL HYPERTENSION WITH GOAL BLOOD PRESSURE LESS THAN 130/85: ICD-10-CM

## 2017-08-02 DIAGNOSIS — E78.2 MIXED HYPERLIPIDEMIA: ICD-10-CM

## 2017-08-02 DIAGNOSIS — E11.9 TYPE 2 DIABETES MELLITUS WITHOUT COMPLICATION, WITHOUT LONG-TERM CURRENT USE OF INSULIN (HCC): ICD-10-CM

## 2017-08-02 DIAGNOSIS — J44.0 CHRONIC OBSTRUCTIVE PULMONARY DISEASE WITH ACUTE LOWER RESPIRATORY INFECTION (HCC): Primary | ICD-10-CM

## 2017-08-02 PROCEDURE — G0463 HOSPITAL OUTPT CLINIC VISIT: HCPCS | Performed by: INTERNAL MEDICINE

## 2017-08-02 PROCEDURE — 99214 OFFICE O/P EST MOD 30 MIN: CPT | Performed by: INTERNAL MEDICINE

## 2017-08-02 NOTE — PROGRESS NOTES
HPI:   1. Chronic obstructive pulmonary disease with acute lower respiratory infection ([address]    Has been having breathing issues. Feeling much better. Appetite good. Wears oxygen overnite. Nose gets quite dry at times.      2. Type 2 diabetes mellitus witho Date Value   09/26/2015 54   05/20/2014 62 (H)   08/22/2012 56   ----------  LDL Cholesterol (mg/dL)   Date Value   06/18/2017 51   10/09/2016 73   09/26/2015 80   05/20/2014 66   08/22/2012 86   ----------  AST (U/L)   Date Value   10/09/2016 20   09/26 1/1/1985  Alcohol use: Yes              Comment: 1 glass wine socially    Exercise:  twice per week.   Diet: watches sugar closely     REVIEW OF SYSTEMS:   GENERAL HEALTH: feels well otherwise  SKIN: denies any unusual skin lesions or rashes  RESPIRATORY: d Future    3. Essential hypertension with goal blood pressure less than 130/85    Patient instructed to take anti-hypertensive medicines exactly as prescribed and to follow a low salt diet as discussed.  Regular exercise at least 3 times weekly will help to

## 2017-08-22 ENCOUNTER — MED REC SCAN ONLY (OUTPATIENT)
Dept: INTERNAL MEDICINE CLINIC | Facility: CLINIC | Age: 77
End: 2017-08-22

## 2017-10-05 ENCOUNTER — MED REC SCAN ONLY (OUTPATIENT)
Dept: INTERNAL MEDICINE CLINIC | Facility: CLINIC | Age: 77
End: 2017-10-05

## 2017-12-06 ENCOUNTER — MED REC SCAN ONLY (OUTPATIENT)
Dept: INTERNAL MEDICINE CLINIC | Facility: CLINIC | Age: 77
End: 2017-12-06

## 2018-01-04 ENCOUNTER — APPOINTMENT (OUTPATIENT)
Dept: LAB | Facility: HOSPITAL | Age: 78
End: 2018-01-04
Attending: INTERNAL MEDICINE
Payer: MEDICARE

## 2018-01-04 ENCOUNTER — OFFICE VISIT (OUTPATIENT)
Dept: INTERNAL MEDICINE CLINIC | Facility: CLINIC | Age: 78
End: 2018-01-04

## 2018-01-04 VITALS
HEART RATE: 76 BPM | DIASTOLIC BLOOD PRESSURE: 70 MMHG | SYSTOLIC BLOOD PRESSURE: 136 MMHG | BODY MASS INDEX: 23 KG/M2 | RESPIRATION RATE: 16 BRPM | WEIGHT: 126 LBS

## 2018-01-04 DIAGNOSIS — E11.9 TYPE 2 DIABETES MELLITUS WITHOUT COMPLICATION, WITHOUT LONG-TERM CURRENT USE OF INSULIN (HCC): ICD-10-CM

## 2018-01-04 DIAGNOSIS — E78.2 MIXED HYPERLIPIDEMIA: ICD-10-CM

## 2018-01-04 DIAGNOSIS — I10 ESSENTIAL HYPERTENSION WITH GOAL BLOOD PRESSURE LESS THAN 130/85: ICD-10-CM

## 2018-01-04 DIAGNOSIS — J44.0 CHRONIC OBSTRUCTIVE PULMONARY DISEASE WITH ACUTE LOWER RESPIRATORY INFECTION (HCC): Primary | ICD-10-CM

## 2018-01-04 LAB — HBA1C MFR BLD: 6.7 % (ref 4–6)

## 2018-01-04 PROCEDURE — 36415 COLL VENOUS BLD VENIPUNCTURE: CPT

## 2018-01-04 PROCEDURE — 83036 HEMOGLOBIN GLYCOSYLATED A1C: CPT

## 2018-01-04 PROCEDURE — 99214 OFFICE O/P EST MOD 30 MIN: CPT | Performed by: INTERNAL MEDICINE

## 2018-01-04 PROCEDURE — G0463 HOSPITAL OUTPT CLINIC VISIT: HCPCS | Performed by: INTERNAL MEDICINE

## 2018-01-04 RX ORDER — METHYLPREDNISOLONE 4 MG/1
TABLET ORAL
Qty: 1 KIT | Refills: 0 | Status: SHIPPED | OUTPATIENT
Start: 2018-01-04 | End: 2018-03-01 | Stop reason: ALTCHOICE

## 2018-01-04 NOTE — PROGRESS NOTES
HPI:   1. Chronic obstructive pulmonary disease with acute lower respiratory infection ([address]    Has been having breathing issues. Feeling much better. Appetite good. Wears oxygen overnite. Nose gets quite dry at times.      2. Type 2 diabetes mellitus witho Date Value   09/26/2015 54   05/20/2014 62 (H)   08/22/2012 56   ----------  LDL Cholesterol (mg/dL)   Date Value   06/18/2017 51   10/09/2016 73   09/26/2015 80   05/20/2014 66   08/22/2012 86   ----------  AST (U/L)   Date Value   10/09/2016 20   09/26 1/1/1985  Alcohol use: Yes              Comment: 1 glass wine socially    Exercise:  twice per week.   Diet: watches sugar closely     REVIEW OF SYSTEMS:   GENERAL HEALTH: feels well otherwise  SKIN: denies any unusual skin lesions or rashes  RESPIRATORY: d daily.    -HgA1C    3. Essential hypertension with goal blood pressure less than 130/85    Patient instructed to take anti-hypertensive medicines exactly as prescribed and to follow a low salt diet as discussed.  Regular exercise at least 3 times weekly jean

## 2018-02-16 ENCOUNTER — NURSE TRIAGE (OUTPATIENT)
Dept: OTHER | Age: 78
End: 2018-02-16

## 2018-02-16 NOTE — TELEPHONE ENCOUNTER
Action Requested: Summary for Provider     []  Critical Lab, Recommendations Needed  [] Need Additional Advice  []   FYI    []   Need Orders  [x] Need Medications Sent to Pharmacy  []  Other     SUMMARY: pt requesting zpack to pharmacy on file  < 12 hours

## 2018-02-17 RX ORDER — AZITHROMYCIN 250 MG/1
TABLET, FILM COATED ORAL
Qty: 6 TABLET | Refills: 0 | Status: SHIPPED | OUTPATIENT
Start: 2018-02-17 | End: 2018-03-01 | Stop reason: ALTCHOICE

## 2018-02-17 NOTE — TELEPHONE ENCOUNTER
Spoke with patient and relayed LV message below--patient verbalizes understanding and agreement. Patient declines appt at this time. Relayed to patient to call back or go to UC/ER if symptoms worsen-patient verbalizes understanding and agreement.  No furthe

## 2018-02-17 NOTE — TELEPHONE ENCOUNTER
Dr. Doron Morrison on call for DR ZAPATA:    Patient called back on status of message. Patient c/o of cough with congestion; feels something in chest.  Feels her symptoms are progressing. Is taking her inhalers as needed; denied sob; no wheezing.   She states she

## 2018-03-01 ENCOUNTER — OFFICE VISIT (OUTPATIENT)
Dept: PULMONOLOGY | Facility: CLINIC | Age: 78
End: 2018-03-01

## 2018-03-01 VITALS
DIASTOLIC BLOOD PRESSURE: 53 MMHG | HEART RATE: 92 BPM | SYSTOLIC BLOOD PRESSURE: 139 MMHG | HEIGHT: 62 IN | OXYGEN SATURATION: 94 % | BODY MASS INDEX: 23.62 KG/M2 | WEIGHT: 128.38 LBS

## 2018-03-01 DIAGNOSIS — J43.2 CENTRILOBULAR EMPHYSEMA (HCC): Primary | ICD-10-CM

## 2018-03-01 DIAGNOSIS — J30.89 ALLERGIC RHINITIS DUE TO OTHER ALLERGIC TRIGGER, UNSPECIFIED SEASONALITY: ICD-10-CM

## 2018-03-01 PROCEDURE — G0463 HOSPITAL OUTPT CLINIC VISIT: HCPCS | Performed by: INTERNAL MEDICINE

## 2018-03-01 PROCEDURE — 99214 OFFICE O/P EST MOD 30 MIN: CPT | Performed by: INTERNAL MEDICINE

## 2018-03-01 NOTE — PROGRESS NOTES
Pulmonary Follow Up Note    HPI:   Kamaljit Mo is a 78year old female with Patient presents with:  COPD    Yesi Garcia MD    States breathing fine  No sob  No cough  No wheeze    AECOPD x 1 recently  S/p flu    Advair \"As needed\"  Once per reaction(s): PENICILLINS    Social History:  Social History    Marital status:             Spouse name:                       Years of education:                 Number of children:               Social History Main Topics    Smoking status: Former Tracheobronchomalacia  VCD- Vocal cord dysfunction

## 2018-03-01 NOTE — PATIENT INSTRUCTIONS
I prescribed a new inhaler to REPLACE the Advair  It is used once per day and its called Seth Knox    Let me know if it's too expensive and we can try another one.

## 2018-04-05 ENCOUNTER — TELEPHONE (OUTPATIENT)
Dept: PULMONOLOGY | Facility: CLINIC | Age: 78
End: 2018-04-05

## 2018-04-05 NOTE — TELEPHONE ENCOUNTER
Not using it and still needing it and discontinuing it are all different things    They can take it if she is not using it but I am not discontinuing it

## 2018-04-05 NOTE — TELEPHONE ENCOUNTER
Teresa/VICTORINO states pt is no longer using Oxygen and is requesting an order to discontinue Oxygen.  Thank you [phone]

## 2018-04-06 NOTE — TELEPHONE ENCOUNTER
Pt informed MM will not send d/c order for oxygen, pt will require testing, pt scheduled appt for 5/10 @ 2:30 pm [facility] to discuss oxygen with MM.  Pt also informed she can discuss getting rid of oxygen per medical advice with HME if she wishes but advised ag

## 2018-04-08 ENCOUNTER — APPOINTMENT (OUTPATIENT)
Dept: GENERAL RADIOLOGY | Facility: HOSPITAL | Age: 78
DRG: 190 | End: 2018-04-08
Attending: EMERGENCY MEDICINE
Payer: MEDICARE

## 2018-04-08 ENCOUNTER — OFFICE VISIT (OUTPATIENT)
Dept: FAMILY MEDICINE CLINIC | Facility: CLINIC | Age: 78
End: 2018-04-08

## 2018-04-08 ENCOUNTER — HOSPITAL ENCOUNTER (INPATIENT)
Facility: HOSPITAL | Age: 78
LOS: 6 days | Discharge: NURSING, REHAB OR LONG-TERM CARE | DRG: 190 | End: 2018-04-16
Attending: EMERGENCY MEDICINE | Admitting: HOSPITALIST
Payer: MEDICARE

## 2018-04-08 VITALS
RESPIRATION RATE: 24 BRPM | TEMPERATURE: 99 F | DIASTOLIC BLOOD PRESSURE: 52 MMHG | OXYGEN SATURATION: 86 % | HEART RATE: 77 BPM | SYSTOLIC BLOOD PRESSURE: 102 MMHG

## 2018-04-08 DIAGNOSIS — R09.02 HYPOXIA: ICD-10-CM

## 2018-04-08 DIAGNOSIS — Z02.9 ADMINISTRATIVE ENCOUNTER: Primary | ICD-10-CM

## 2018-04-08 DIAGNOSIS — J44.1 COPD EXACERBATION (HCC): Primary | ICD-10-CM

## 2018-04-08 DIAGNOSIS — J21.1 ACUTE BRONCHIOLITIS DUE TO HUMAN METAPNEUMOVIRUS: ICD-10-CM

## 2018-04-08 PROCEDURE — 71046 X-RAY EXAM CHEST 2 VIEWS: CPT | Performed by: EMERGENCY MEDICINE

## 2018-04-08 PROCEDURE — 99220 INITIAL OBSERVATION CARE,LEVL III: CPT | Performed by: HOSPITALIST

## 2018-04-08 RX ORDER — PRAVASTATIN SODIUM 20 MG
20 TABLET ORAL NIGHTLY
Status: DISCONTINUED | OUTPATIENT
Start: 2018-04-08 | End: 2018-04-16

## 2018-04-08 RX ORDER — IPRATROPIUM BROMIDE AND ALBUTEROL SULFATE 2.5; .5 MG/3ML; MG/3ML
3 SOLUTION RESPIRATORY (INHALATION)
Status: DISCONTINUED | OUTPATIENT
Start: 2018-04-09 | End: 2018-04-11

## 2018-04-08 RX ORDER — 0.9 % SODIUM CHLORIDE 0.9 %
VIAL (ML) INJECTION
Status: COMPLETED
Start: 2018-04-08 | End: 2018-04-08

## 2018-04-08 RX ORDER — ALBUTEROL SULFATE 2.5 MG/3ML
10 SOLUTION RESPIRATORY (INHALATION) CONTINUOUS
Status: DISCONTINUED | OUTPATIENT
Start: 2018-04-08 | End: 2018-04-16

## 2018-04-08 RX ORDER — LISINOPRIL 20 MG/1
20 TABLET ORAL
Status: DISCONTINUED | OUTPATIENT
Start: 2018-04-09 | End: 2018-04-11

## 2018-04-08 RX ORDER — DILTIAZEM HYDROCHLORIDE 240 MG/1
240 CAPSULE, COATED, EXTENDED RELEASE ORAL
Status: DISCONTINUED | OUTPATIENT
Start: 2018-04-09 | End: 2018-04-08

## 2018-04-08 RX ORDER — DEXTROSE MONOHYDRATE 25 G/50ML
50 INJECTION, SOLUTION INTRAVENOUS AS NEEDED
Status: DISCONTINUED | OUTPATIENT
Start: 2018-04-08 | End: 2018-04-16

## 2018-04-08 RX ORDER — METHYLPREDNISOLONE SODIUM SUCCINATE 125 MG/2ML
60 INJECTION, POWDER, LYOPHILIZED, FOR SOLUTION INTRAMUSCULAR; INTRAVENOUS ONCE
Status: COMPLETED | OUTPATIENT
Start: 2018-04-08 | End: 2018-04-08

## 2018-04-08 RX ORDER — IPRATROPIUM BROMIDE AND ALBUTEROL SULFATE 2.5; .5 MG/3ML; MG/3ML
3 SOLUTION RESPIRATORY (INHALATION) ONCE
Status: COMPLETED | OUTPATIENT
Start: 2018-04-08 | End: 2018-04-08

## 2018-04-08 RX ORDER — ACETAMINOPHEN 325 MG/1
650 TABLET ORAL EVERY 6 HOURS PRN
Status: DISCONTINUED | OUTPATIENT
Start: 2018-04-08 | End: 2018-04-16

## 2018-04-08 RX ORDER — AZITHROMYCIN 250 MG/1
500 TABLET, FILM COATED ORAL
Status: COMPLETED | OUTPATIENT
Start: 2018-04-09 | End: 2018-04-12

## 2018-04-08 RX ORDER — METHYLPREDNISOLONE SODIUM SUCCINATE 40 MG/ML
40 INJECTION, POWDER, LYOPHILIZED, FOR SOLUTION INTRAMUSCULAR; INTRAVENOUS EVERY 6 HOURS
Status: DISCONTINUED | OUTPATIENT
Start: 2018-04-08 | End: 2018-04-09

## 2018-04-08 RX ORDER — DILTIAZEM HYDROCHLORIDE 240 MG/1
240 CAPSULE, COATED, EXTENDED RELEASE ORAL NIGHTLY
Status: DISCONTINUED | OUTPATIENT
Start: 2018-04-08 | End: 2018-04-16

## 2018-04-08 RX ORDER — AZITHROMYCIN 250 MG/1
500 TABLET, FILM COATED ORAL ONCE
Status: COMPLETED | OUTPATIENT
Start: 2018-04-08 | End: 2018-04-08

## 2018-04-08 RX ORDER — HEPARIN SODIUM 5000 [USP'U]/ML
5000 INJECTION, SOLUTION INTRAVENOUS; SUBCUTANEOUS EVERY 8 HOURS
Status: DISCONTINUED | OUTPATIENT
Start: 2018-04-09 | End: 2018-04-12

## 2018-04-08 RX ORDER — ONDANSETRON 2 MG/ML
4 INJECTION INTRAMUSCULAR; INTRAVENOUS EVERY 6 HOURS PRN
Status: DISCONTINUED | OUTPATIENT
Start: 2018-04-08 | End: 2018-04-16

## 2018-04-08 NOTE — PROGRESS NOTES
Pt. To [facility] c/o cough x1 days. Pt. Reports she knows she needs antibiotics. Upon getting vitals on patient, 02 started out at 77% with good pleth. Had patient take several deep breaths and able to get 02 between 85-88%.   Lung sounds coarse and diminished

## 2018-04-08 NOTE — ED INITIAL ASSESSMENT (HPI)
Pt presents to ED with a c/o cough since yesterday. Seen at [address] [place] [address] and sent here d/t spo2 in the 70's. Pt has a hx of copd, usually on 1L home oxygen but states she hasn't been using it over the last few days.

## 2018-04-09 PROCEDURE — 99226 SUBSEQUENT OBSERVATION CARE: CPT | Performed by: HOSPITALIST

## 2018-04-09 PROCEDURE — 99222 1ST HOSP IP/OBS MODERATE 55: CPT | Performed by: INTERNAL MEDICINE

## 2018-04-09 RX ORDER — METHYLPREDNISOLONE SODIUM SUCCINATE 40 MG/ML
40 INJECTION, POWDER, LYOPHILIZED, FOR SOLUTION INTRAMUSCULAR; INTRAVENOUS EVERY 12 HOURS
Status: COMPLETED | OUTPATIENT
Start: 2018-04-09 | End: 2018-04-11

## 2018-04-09 RX ORDER — CODEINE PHOSPHATE AND GUAIFENESIN 10; 100 MG/5ML; MG/5ML
5 SOLUTION ORAL EVERY 4 HOURS PRN
Status: DISCONTINUED | OUTPATIENT
Start: 2018-04-09 | End: 2018-04-16

## 2018-04-09 NOTE — PLAN OF CARE
Problem: Diabetes/Glucose Control  Goal: Glucose maintained within prescribed range  INTERVENTIONS:  - Monitor Blood Glucose as ordered  - Assess for signs and symptoms of hyperglycemia and hypoglycemia  - Administer ordered medications to maintain glucose fall precautions as indicated by assessment.  - Educate pt/family on patient safety including physical limitations  - Instruct pt to call for assistance with activity based on assessment  - Modify environment to reduce risk of injury  - Provide assistive d

## 2018-04-09 NOTE — CONSULTS
Pulmonary/Critical Care Consult Note    HPI:   Jonathan Rodriguez is a 78year old female with Patient presents with:  Dyspnea IKER SOB (respiratory)  Cough/URI    Edita Vincent MD    Pt is a 77 yo with h/o COPD, DM, DD, HTN, HL and other med prob who p 1 puff Inhalation Daily   lisinopril (PRINIVIL,ZESTRIL) tab 20 mg 20 mg Oral Daily   Pravastatin Sodium (PRAVACHOL) tab 20 mg 20 mg Oral Nightly   ondansetron HCl (ZOFRAN) injection 4 mg 4 mg Intravenous Q6H PRN   Heparin Sodium (Porcine) 5000 UNIT/ML inje 141/52 (BP Location: Right arm)   Pulse 74   Temp 98.4 °F (36.9 °C) (Oral)   Resp 18   Ht 5' 2\" (1.575 m)   Wt 126 lb 3.2 oz (57.2 kg)   SpO2 94%   BMI 23.08 kg/m²     Intake/Output Summary (Last 24 hours) at 04/09/18 1552  Last data filed at 04/09/18 090

## 2018-04-09 NOTE — PLAN OF CARE
Problem: Patient/Family Goals  Goal: Patient/Family Long Term Goal  Patient's Long Term Goal: to return home    Interventions:  - monitor labs  - monitor vitals  - administer O2 as needed  - See additional Care Plan goals for specific interventions    Outc home or other facility with appropriate resources  INTERVENTIONS:  - Identify barriers to discharge w/pt and caregiver  - Include patient/family/discharge partner in discharge planning  - Arrange for needed discharge resources and transportation as appropr

## 2018-04-09 NOTE — H&P
[address] [place] [address],[address] Patient Status:  Emergency    1940 MRN [id number]   Location [address] Attending Charlie Ortiz MD   Hosp Day # 0 PCP Can Freeman MD     Date: Comment:Other reaction(s): PENICILLINS    Home Medications:  Prior to Admission Medications   Prescriptions Last Dose Informant Patient Reported? Taking?    ADVAIR DISKUS 250-50 MCG/DOSE Inhalation Aerosol Powder, Breath Activated   No No   Sig: INHALE 1 PU Normocephalic, oral mucosa is moist.  Head:  Normocephalic, atraumatic. Neck:  Supple, non-tender, no carotid bruit, no jugular venous distention, no lymphadenopathy, no thyromegaly.   Respiratory:  Lungs are clear to auscultation, no wheezing appreciated symptom care    Benign hypertension  Blood pressure suboptimally controlled, will resume antihypertensives and evaluate response    Hyperlipidemia  Continue statin    Prophylaxis  Subcutaneous heparin    CODE STATUS  Full    Primary care physician  Ana Childers

## 2018-04-09 NOTE — PROGRESS NOTES
[facility]    Progress Note    Solo Rios Patient Status:  Observation    1940 MRN [id number]   Location [address] [facility] Attending Elias Trent MD   Hosp Day # 0 PCP Cruz Toledo MD       SUBJECTIVE:  Still navarro Meds:     Current Facility-Administered Medications:  guaiFENesin-codeine (ROBITUSSIN AC) 100-10 MG/5ML syrup 5 mL 5 mL Oral Q4H PRN   MethylPREDNISolone Sodium Succ (Solu-MEDROL) injection 40 mg 40 mg Intravenous Q12H   albuterol sulfate (VENTOLIN) has been consulted, patient started on duo nebs every 4 hours and as needed along with Solu-Medrol 40 mg IV every 8 hours. Will continue Zithromax started in ER.   Continue to monitor respiratory status closely.     Acute on chronic respiratory failure  Tr

## 2018-04-09 NOTE — ED PROVIDER NOTES
Patient Seen in: [facility]    History   Patient presents with:  Dyspnea IKER SOB (respiratory)  Cough/URI    Stated Complaint: cough, hypoxia (76%RA)    HPI    78year old female with a past medical history of COPD, diabetes, hypertension, hyper (Room air)    Current:/45 (BP Location: Right arm)   Pulse 84   Temp 98.8 °F (37.1 °C) (Oral)   Resp 20   Ht 157.5 cm (5' 2\")   Wt 57.2 kg   SpO2 94%   BMI 23.08 kg/m²         Physical Exam   Constitutional: She is oriented to person, place, and [place] W/ DIFFERENTIAL - Abnormal; Notable for the following:     Lymphocyte Absolute 0.9 (*)     All other components within normal limits   TROPONIN I - Normal   LACTIC ACID, PLASMA - Normal   CBC WITH DIFFERENTIAL WITH PLATELET    Narrative:      The following I feel that she will tire out if she goes home, she is not using her meds or her oxygen there. The patient will be admitted for COPD exacerbation, later is positive for Metapneumo virus.     Admission disposition: 4/8/2018  8:19 PM         D/w Dr Massiel Godinez an

## 2018-04-10 PROCEDURE — 99226 SUBSEQUENT OBSERVATION CARE: CPT | Performed by: HOSPITALIST

## 2018-04-10 PROCEDURE — 99232 SBSQ HOSP IP/OBS MODERATE 35: CPT | Performed by: INTERNAL MEDICINE

## 2018-04-10 RX ORDER — INSULIN ASPART 100 [IU]/ML
3 INJECTION, SOLUTION INTRAVENOUS; SUBCUTANEOUS ONCE
Status: COMPLETED | OUTPATIENT
Start: 2018-04-10 | End: 2018-04-10

## 2018-04-10 NOTE — PROGRESS NOTES
[facility]    Progress Note    Philip García Patient Status:  Observation    1940 MRN [id number]   Location [address] [facility] Attending Srinivas Fam MD   Hosp Day # 0 PCP Monie Barry MD       SUBJECTIVE:  Still co Facility-Administered Medications:  insulin detemir (LEVEMIR) 100 UNIT/ML flextouch 15 Units 15 Units Subcutaneous Nightly   Insulin Aspart Pen (NOVOLOG) 100 UNIT/ML flexpen 6 Units 6 Units Subcutaneous TID CC   guaiFENesin-codeine (ROBITUSSIN AC) 100-10 M respiratory infection ([facility]     Acute bronchiolitis due to human metapneumovirus     COPD exacerbation ([facility]      Plan:     Acute exacerbation of COPD  Pulmonary has been consulted, patient started on duo nebs every 4 hours and as needed along with Solu-Me

## 2018-04-10 NOTE — PLAN OF CARE
Problem: Diabetes/Glucose Control  Goal: Glucose maintained within prescribed range  INTERVENTIONS:  - Monitor Blood Glucose as ordered  - Assess for signs and symptoms of hyperglycemia and hypoglycemia  - Administer ordered medications to maintain glucose and physical deficits and behaviors that affect risk of falls.   - [place] fall precautions as indicated by assessment.  - Educate pt/family on patient safety including physical limitations  - Instruct pt to call for assistance with activity based on asse know as we care for you? \" I live by myself. My daughters are in the area\".   - Provide timely, complete, and accurate information to patient/family  - Incorporate patient and family knowledge, values, beliefs, and cultural backgrounds into the planning a

## 2018-04-10 NOTE — PROGRESS NOTES
[facility]     Progress Note        Sun Morales Patient Status:  Inpatient    1940 MRN [id number]   Location [address] [facility] Attending Leno Bah MD   Hosp Day # 0 PCP Radha Prieto MD       Subjective:   Divya Champion 4-0.006 g chewable tab 4 tablet 4 tablet Oral Q15 Min PRN   glucose (DEX4) oral liquid 15 g 15 g Oral Q15 Min PRN   Insulin Aspart Pen (NOVOLOG) 100 UNIT/ML flexpen 1-11 Units 1-11 Units Subcutaneous TID CC and HS   DilTIAZem HCl ER Coated Beads (CARDIZEM significant acute abnormality seen  -Gradually wean steroid therapy  -ICS/LABA  -Supportive care for viral respiratory infection  -Activity as tolerated  -Anticipate discharge in next 1-2 days    Dereck Nobles DO  Pulmonary Critical Care Medicine  [facility]

## 2018-04-10 NOTE — PLAN OF CARE
Problem: Diabetes/Glucose Control  Goal: Glucose maintained within prescribed range  INTERVENTIONS:  - Monitor Blood Glucose as ordered  - Assess for signs and symptoms of hyperglycemia and hypoglycemia  - Administer ordered medications to maintain glucose fall precautions as indicated by assessment.  - Educate pt/family on patient safety including physical limitations  - Instruct pt to call for assistance with activity based on assessment  - Modify environment to reduce risk of injury  - Provide assistive d patient/family  - Incorporate patient and family knowledge, values, beliefs, and cultural backgrounds into the planning and delivery of care  - Encourage patient/family to participate in care and decision-making at the level they choose  - Honor patient an

## 2018-04-11 PROCEDURE — 99233 SBSQ HOSP IP/OBS HIGH 50: CPT | Performed by: HOSPITALIST

## 2018-04-11 PROCEDURE — 99232 SBSQ HOSP IP/OBS MODERATE 35: CPT | Performed by: INTERNAL MEDICINE

## 2018-04-11 RX ORDER — IPRATROPIUM BROMIDE AND ALBUTEROL SULFATE 2.5; .5 MG/3ML; MG/3ML
3 SOLUTION RESPIRATORY (INHALATION)
Status: DISCONTINUED | OUTPATIENT
Start: 2018-04-11 | End: 2018-04-16

## 2018-04-11 RX ORDER — 0.9 % SODIUM CHLORIDE 0.9 %
VIAL (ML) INJECTION
Status: COMPLETED
Start: 2018-04-11 | End: 2018-04-11

## 2018-04-11 RX ORDER — PREDNISONE 20 MG/1
40 TABLET ORAL
Status: DISCONTINUED | OUTPATIENT
Start: 2018-04-12 | End: 2018-04-14

## 2018-04-11 RX ORDER — LISINOPRIL 10 MG/1
10 TABLET ORAL
Status: DISCONTINUED | OUTPATIENT
Start: 2018-04-12 | End: 2018-04-12

## 2018-04-11 NOTE — PLAN OF CARE
Problem: Diabetes/Glucose Control  Goal: Glucose maintained within prescribed range  INTERVENTIONS:  - Monitor Blood Glucose as ordered  - Assess for signs and symptoms of hyperglycemia and hypoglycemia  - Administer ordered medications to maintain glucose needs  - Identify cognitive and physical deficits and behaviors that affect risk of falls.   - [place] fall precautions as indicated by assessment.  - Educate pt/family on patient safety including physical limitations  - Instruct pt to call for assistance patient's plan of care  Interventions:  - What would you like us to know as we care for you? \" I live by myself. My daughters are in the area\".   - Provide timely, complete, and accurate information to patient/family  - Incorporate patient and family know

## 2018-04-11 NOTE — PLAN OF CARE
Problem: Diabetes/Glucose Control  Goal: Glucose maintained within prescribed range  INTERVENTIONS:  - Monitor Blood Glucose as ordered  - Assess for signs and symptoms of hyperglycemia and hypoglycemia  - Administer ordered medications to maintain glucose Assess pt frequently for physical needs  - Identify cognitive and physical deficits and behaviors that affect risk of falls.   - [place] fall precautions as indicated by assessment.  - Educate pt/family on patient safety including physical limitations  - care  Interventions:  - What would you like us to know as we care for you? \" I live by myself. My daughters are in the area\".   - Provide timely, complete, and accurate information to patient/family  - Incorporate patient and family knowledge, values, bel

## 2018-04-11 NOTE — PROGRESS NOTES
[facility]    Progress Note    Phoebe Mention Patient Status:  Observation    1940 MRN [id number]   Location [facility] Attending Jorge A Cadet MD   Hosp Day # 1 PCP Can Freeman MD       SUBJECTIVE:  Cough an 4/12/2018] lisinopril (PRINIVIL,ZESTRIL) tab 10 mg 10 mg Oral Daily   [START ON 4/12/2018] predniSONE (DELTASONE) tab 40 mg 40 mg Oral Daily with breakfast   insulin detemir (LEVEMIR) 100 UNIT/ML flextouch 15 Units 15 Units Subcutaneous Nightly   Insulin A bronchiolitis due to human metapneumovirus     COPD exacerbation (HCC)      Plan:     Acute exacerbation of COPD  -harshil pulmonary consult  -duo nebs every 4 hours and as needed along with Solu-Medrol 40 mg IV every 8 hours.   Solu-medrol now decreased to q12

## 2018-04-11 NOTE — PROGRESS NOTES
Pulmonary/Critical Care Follow Up Note    HPI:   Ben Escobar is a 78year old female with Patient presents with:  Dyspnea IKER SOB (respiratory)  Cough/URI      PCP Nikole Norman MD  Admission Attending Kesha Cadena MD    Hospital Day #1    Fe glucose (DEX4) oral liquid 15 g, 15 g, Oral, Q15 Min PRN  •  Insulin Aspart Pen (NOVOLOG) 100 UNIT/ML flexpen 1-11 Units, 1-11 Units, Subcutaneous, TID CC and HS  •  DilTIAZem HCl ER Coated Beads (CARDIZEM CD) 24 hr cap 240 mg, 240 mg, Oral, Nightly      A

## 2018-04-12 PROCEDURE — 99233 SBSQ HOSP IP/OBS HIGH 50: CPT | Performed by: HOSPITALIST

## 2018-04-12 PROCEDURE — 99232 SBSQ HOSP IP/OBS MODERATE 35: CPT | Performed by: INTERNAL MEDICINE

## 2018-04-12 RX ORDER — HYDROCODONE POLISTIREX AND CHLORPHENIRAMINE POLISTIREX 10; 8 MG/5ML; MG/5ML
5 SUSPENSION, EXTENDED RELEASE ORAL 2 TIMES DAILY
Status: DISCONTINUED | OUTPATIENT
Start: 2018-04-12 | End: 2018-04-16

## 2018-04-12 RX ORDER — GUAIFENESIN/DEXTROMETHORPHAN 100-10MG/5
100 SYRUP ORAL EVERY 4 HOURS PRN
Status: DISCONTINUED | OUTPATIENT
Start: 2018-04-12 | End: 2018-04-12

## 2018-04-12 RX ORDER — AMLODIPINE BESYLATE 2.5 MG/1
2.5 TABLET ORAL DAILY
Status: DISCONTINUED | OUTPATIENT
Start: 2018-04-12 | End: 2018-04-13

## 2018-04-12 RX ORDER — SODIUM POLYSTYRENE SULFONATE 15 G/60ML
30 SUSPENSION ORAL; RECTAL ONCE
Status: COMPLETED | OUTPATIENT
Start: 2018-04-12 | End: 2018-04-12

## 2018-04-12 NOTE — PLAN OF CARE
Problem: Diabetes/Glucose Control  Goal: Glucose maintained within prescribed range  INTERVENTIONS:  - Monitor Blood Glucose as ordered  - Assess for signs and symptoms of hyperglycemia and hypoglycemia  - Administer ordered medications to maintain glucose injury  INTERVENTIONS:  - Assess pt frequently for physical needs  - Identify cognitive and physical deficits and behaviors that affect risk of falls.   - [place] fall precautions as indicated by assessment.  - Educate pt/family on patient safety includin preferences are identified and integrated in the patient's plan of care  Interventions:  - What would you like us to know as we care for you? \" I live by myself. My daughters are in the area\".   - Provide timely, complete, and accurate information to apollo

## 2018-04-12 NOTE — PHYSICAL THERAPY NOTE
PHYSICAL THERAPY EVALUATION - INPATIENT     Room Number: 883/649-L  Evaluation Date: 4/12/2018  Type of Evaluation: Initial   Physician Order: PT Eval and Treat    Presenting Problem: Acute exacerbation of COPD, Acute on chronic respiratory failure  Reaso Diagnosis Date   • Asthma 1948   • COPD ([facility] 2003   • Diabetes ([facility]     Type II   • Diastolic dysfunction 1337    Grade I   • Diverticulitis 2005   • Essential hypertension    • Hyperlipidemia    • Non-compliance    • Perforated ear drum        Past Solorzano from a bed to a chair (including a wheelchair)?: None   -   Need to walk in hospital room?: A Little   -   Climbing 3-5 steps with a railing?: A Little     AM-PAC Score:  Raw Score: 22   PT Approx Degree of Impairment Score: 20.91%   Standardized Score (AM

## 2018-04-12 NOTE — PROGRESS NOTES
[facility]    Progress Note    Vicki Cortez Patient Status:  Observation    1940 MRN [id number]   Location [facility] Attending Berenice Saucedo MD   Hosp Day # 2 PCP Sarah Laird MD       SUBJECTIVE:  sonam Pen (NOVOLOG) 100 UNIT/ML flexpen 6 Units 6 Units Subcutaneous TID CC   guaiFENesin-codeine (ROBITUSSIN AC) 100-10 MG/5ML syrup 5 mL 5 mL Oral Q4H PRN   albuterol sulfate (VENTOLIN) (2.5 MG/3ML) 0.083% nebulizer solution 10 mg 10 mg Nebulization Continuous closely.  -add tussionex bid     Acute on chronic respiratory failure  Triggered by COPD exacerbation, as per patient at 1 L at home currently requiring 4 L nasal cannula, will continue the same for now, later on titrated down as tolerated     Uncontrolled

## 2018-04-12 NOTE — PLAN OF CARE
Problem: Diabetes/Glucose Control  Goal: Glucose maintained within prescribed range  INTERVENTIONS:  - Monitor Blood Glucose as ordered  - Assess for signs and symptoms of hyperglycemia and hypoglycemia  - Administer ordered medications to maintain glucose affect risk of falls.   - [place] fall precautions as indicated by assessment.  - Educate pt/family on patient safety including physical limitations  - Instruct pt to call for assistance with activity based on assessment  - Modify environment to reduce ri

## 2018-04-12 NOTE — CM/SW NOTE
Referral placed to [facility] for home oxygen    Tent dc date 4/13    [facility] [id number]

## 2018-04-12 NOTE — PROGRESS NOTES
Pulmonary/Critical Care Follow Up Note    HPI:   Sun Morales is a 78year old female with Patient presents with:  Dyspnea IKER SOB (respiratory)  Cough/URI      PCP Radha Prieto MD  Admission Attending Tobie Sandifer, [address] Day #2    Fe Min PRN  •  Insulin Aspart Pen (NOVOLOG) 100 UNIT/ML flexpen 1-11 Units, 1-11 Units, Subcutaneous, TID CC and HS  •  DilTIAZem HCl ER Coated Beads (CARDIZEM CD) 24 hr cap 240 mg, 240 mg, Oral, Nightly      Allergies:    Peanuts                     Comment:

## 2018-04-13 PROCEDURE — 99232 SBSQ HOSP IP/OBS MODERATE 35: CPT | Performed by: INTERNAL MEDICINE

## 2018-04-13 PROCEDURE — 99233 SBSQ HOSP IP/OBS HIGH 50: CPT | Performed by: HOSPITALIST

## 2018-04-13 RX ORDER — POTASSIUM CHLORIDE 20 MEQ/1
40 TABLET, EXTENDED RELEASE ORAL EVERY 4 HOURS
Status: COMPLETED | OUTPATIENT
Start: 2018-04-13 | End: 2018-04-13

## 2018-04-13 RX ORDER — SENNA AND DOCUSATE SODIUM 50; 8.6 MG/1; MG/1
2 TABLET, FILM COATED ORAL 2 TIMES DAILY
Status: DISCONTINUED | OUTPATIENT
Start: 2018-04-13 | End: 2018-04-16

## 2018-04-13 RX ORDER — AMLODIPINE BESYLATE 5 MG/1
5 TABLET ORAL DAILY
Status: DISCONTINUED | OUTPATIENT
Start: 2018-04-14 | End: 2018-04-16

## 2018-04-13 RX ORDER — PREDNISONE 20 MG/1
20 TABLET ORAL
Qty: 9 TABLET | Refills: 0 | Status: SHIPPED | OUTPATIENT
Start: 2018-04-14 | End: 2018-04-20

## 2018-04-13 RX ORDER — AMLODIPINE BESYLATE 2.5 MG/1
2.5 TABLET ORAL ONCE
Status: COMPLETED | OUTPATIENT
Start: 2018-04-13 | End: 2018-04-13

## 2018-04-13 RX ORDER — BISACODYL 10 MG
10 SUPPOSITORY, RECTAL RECTAL
Status: DISCONTINUED | OUTPATIENT
Start: 2018-04-13 | End: 2018-04-16

## 2018-04-13 RX ORDER — MAGNESIUM OXIDE 400 MG (241.3 MG MAGNESIUM) TABLET
400 TABLET ONCE
Status: COMPLETED | OUTPATIENT
Start: 2018-04-13 | End: 2018-04-13

## 2018-04-13 RX ORDER — HYDROCODONE POLISTIREX AND CHLORPHENIRAMINE POLISTIREX 10; 8 MG/5ML; MG/5ML
5 SUSPENSION, EXTENDED RELEASE ORAL 2 TIMES DAILY
Qty: 473 ML | Refills: 0 | Status: SHIPPED | OUTPATIENT
Start: 2018-04-13 | End: 2018-04-23

## 2018-04-13 RX ORDER — CODEINE PHOSPHATE AND GUAIFENESIN 10; 100 MG/5ML; MG/5ML
5 SOLUTION ORAL EVERY 4 HOURS PRN
Qty: 236 ML | Refills: 0 | Status: SHIPPED | OUTPATIENT
Start: 2018-04-13 | End: 2018-05-04 | Stop reason: ALTCHOICE

## 2018-04-13 RX ORDER — AMLODIPINE BESYLATE 5 MG/1
5 TABLET ORAL DAILY
Qty: 30 TABLET | Refills: 0 | Status: SHIPPED | OUTPATIENT
Start: 2018-04-14 | End: 2018-04-20

## 2018-04-13 NOTE — PLAN OF CARE
Problem: Diabetes/Glucose Control  Goal: Glucose maintained within prescribed range  INTERVENTIONS:  - Monitor Blood Glucose as ordered  - Assess for signs and symptoms of hyperglycemia and hypoglycemia  - Administer ordered medications to maintain glucose fall precautions as indicated by assessment.  - Educate pt/family on patient safety including physical limitations  - Instruct pt to call for assistance with activity based on assessment  - Modify environment to reduce risk of injury  - Provide assistive d 92-95%. On room air desats to 78%.

## 2018-04-13 NOTE — PHYSICAL THERAPY NOTE
PHYSICAL THERAPY TREATMENT NOTE - INPATIENT     Room Number: 827/892-G       Presenting Problem: Acute exacerbation of COPD, Acute on chronic respiratory failure    Problem List  Principal Problem:    COPD exacerbation ([address]  Active Problems:    Hypoxia currently have. ..  -   Turning over in bed (including adjusting bedclothes, sheets and blankets)?: None   -   Sitting down on and standing up from a chair with arms (e.g., wheelchair, bedside commode, etc.): None   -   Moving from lying on back to sitting Current Status  in progress 4/13/2018     Goal #6     Goal #6  Current Status

## 2018-04-13 NOTE — PROGRESS NOTES
[facility]    Progress Note    Jailene Hubbardalda Patient Status:  Observation    1940 MRN [id number]   Location [facility] Attending Elis Silva MD   Hosp Day # 3 PCP Niesha Palomares MD       SUBJECTIVE:  sonam 2 tablet 2 tablet Oral BID   Hydrocod Polst-CPM Polst ER (TUSSIONEX) 10-8 MG/5ML liquid 5 mL 5 mL Oral BID   ipratropium-albuterol (DUONEB) nebulizer solution 3 mL 3 mL Nebulization QID   predniSONE (DELTASONE) tab 40 mg 40 mg Oral Daily with breakfast   g respiratory status closely.  -add tussionex bid     Acute on chronic respiratory failure  -Triggered by COPD exacerbation, as per patient at 1 L at home currently requiring 4 L nasal cannula, will continue the same for now, later on titrated down as tolera

## 2018-04-13 NOTE — PLAN OF CARE
Problem: Diabetes/Glucose Control  Goal: Glucose maintained within prescribed range  INTERVENTIONS:  - Monitor Blood Glucose as ordered  - Assess for signs and symptoms of hyperglycemia and hypoglycemia  - Administer ordered medications to maintain glucose injury  INTERVENTIONS:  - Assess pt frequently for physical needs  - Identify cognitive and physical deficits and behaviors that affect risk of falls.   - [place] fall precautions as indicated by assessment.  - Educate pt/family on patient safety includin

## 2018-04-13 NOTE — PROGRESS NOTES
Pulmonary/Critical Care Follow Up Note    HPI:   Cherry Bass is a 78year old female with Patient presents with:  Dyspnea IKER SOB (respiratory)  Cough/URI      PCP Brien Santa MD  Admission Attending Aram Crook MD    Hospital Day #3    Fe (NOVOLOG) 100 UNIT/ML flexpen 1-11 Units, 1-11 Units, Subcutaneous, TID CC and HS  •  DilTIAZem HCl ER Coated Beads (CARDIZEM CD) 24 hr cap 240 mg, 240 mg, Oral, Nightly      Allergies:    Peanuts                     Comment:Other reaction(s):  Anaphylaxis

## 2018-04-14 ENCOUNTER — APPOINTMENT (OUTPATIENT)
Dept: CT IMAGING | Facility: HOSPITAL | Age: 78
DRG: 190 | End: 2018-04-14
Attending: HOSPITALIST
Payer: MEDICARE

## 2018-04-14 PROCEDURE — 99233 SBSQ HOSP IP/OBS HIGH 50: CPT | Performed by: HOSPITALIST

## 2018-04-14 PROCEDURE — 71260 CT THORAX DX C+: CPT | Performed by: HOSPITALIST

## 2018-04-14 PROCEDURE — 99232 SBSQ HOSP IP/OBS MODERATE 35: CPT | Performed by: INTERNAL MEDICINE

## 2018-04-14 RX ORDER — INSULIN ASPART 100 [IU]/ML
15 INJECTION, SOLUTION INTRAVENOUS; SUBCUTANEOUS ONCE
Status: COMPLETED | OUTPATIENT
Start: 2018-04-14 | End: 2018-04-14

## 2018-04-14 RX ORDER — MAGNESIUM OXIDE 400 MG (241.3 MG MAGNESIUM) TABLET
400 TABLET ONCE
Status: COMPLETED | OUTPATIENT
Start: 2018-04-14 | End: 2018-04-14

## 2018-04-14 RX ORDER — METHYLPREDNISOLONE SODIUM SUCCINATE 40 MG/ML
40 INJECTION, POWDER, LYOPHILIZED, FOR SOLUTION INTRAMUSCULAR; INTRAVENOUS EVERY 12 HOURS
Status: DISCONTINUED | OUTPATIENT
Start: 2018-04-14 | End: 2018-04-15

## 2018-04-14 RX ORDER — LEVOFLOXACIN 5 MG/ML
750 INJECTION, SOLUTION INTRAVENOUS EVERY 24 HOURS
Status: DISCONTINUED | OUTPATIENT
Start: 2018-04-14 | End: 2018-04-16

## 2018-04-14 RX ORDER — SODIUM CHLORIDE 9 MG/ML
INJECTION, SOLUTION INTRAVENOUS
Status: COMPLETED
Start: 2018-04-14 | End: 2018-04-14

## 2018-04-14 NOTE — PROGRESS NOTES
[facility]    Progress Note    Didier Velazco Patient Status:  Observation    1940 MRN [id number]   Location [facility] Attending Linwood Francisco MD   Hosp Day # 4 PCP Donnell Tello MD       SUBJECTIVE:  Tyrone Amaya tab 5 mg 5 mg Oral Daily   Senna-Docusate Sodium (SENOKOT S) 8.6-50 MG tab 2 tablet 2 tablet Oral BID   bisacodyl (DULCOLAX) rectal suppository 10 mg 10 mg Rectal Daily PRN   Hydrocod Polst-CPM Polst ER (TUSSIONEX) 10-8 MG/5ML liquid 5 mL 5 mL Oral BID   i decreased to q12.  And stopped and switched to orals  -with worsened breathing restarted solu-medrol  -Zithromax completed  -continue to monitor respiratory status closely.  -added tussionex bid  -Plan CT chest   -if shows infection will add abx     Acute o

## 2018-04-14 NOTE — PROGRESS NOTES
[facility]     Progress Note        Mik Vizcaino Patient Status:  Inpatient    1940 MRN [id number]   Location [address] [facility] Attending Og Mora MD   Hosp Day # 4 PCP Aureliano De La Cruz MD       Subjective:   Mariajose Billy Min PRN   Insulin Aspart Pen (NOVOLOG) 100 UNIT/ML flexpen 1-11 Units 1-11 Units Subcutaneous TID CC and HS   DilTIAZem HCl ER Coated Beads (CARDIZEM CD) 24 hr cap 240 mg 240 mg Oral Nightly       Continuous Infusions:   • albuterol sulfate Stopped (04/08/

## 2018-04-15 ENCOUNTER — APPOINTMENT (OUTPATIENT)
Dept: GENERAL RADIOLOGY | Facility: HOSPITAL | Age: 78
DRG: 190 | End: 2018-04-15
Attending: INTERNAL MEDICINE
Payer: MEDICARE

## 2018-04-15 PROCEDURE — 71046 X-RAY EXAM CHEST 2 VIEWS: CPT | Performed by: INTERNAL MEDICINE

## 2018-04-15 PROCEDURE — 99233 SBSQ HOSP IP/OBS HIGH 50: CPT | Performed by: HOSPITALIST

## 2018-04-15 PROCEDURE — 99233 SBSQ HOSP IP/OBS HIGH 50: CPT | Performed by: INTERNAL MEDICINE

## 2018-04-15 RX ORDER — PREDNISONE 20 MG/1
40 TABLET ORAL
Status: DISCONTINUED | OUTPATIENT
Start: 2018-04-15 | End: 2018-04-16

## 2018-04-15 NOTE — SLP NOTE
ADULT SWALLOWING EVALUATION    ASSESSMENT    ASSESSMENT/OVERALL IMPRESSION:  Pt seen for a bedside swallowing evaluation secondary to worsening CXR and cough. Pt was admitted with COPD exacerbation.   The pt is currently on a general solid diet with thin l Recommendations - Solids: Regular  Diet Recommendations - Liquid: Thin (No Straw)                        Compensatory Strategies Recommended: No straws; Slow rate; Alternate consistencies;Small bites and sips;Multiple swallows  Aspiration Precautions: Jared Blackwell aspiration.     4. Status post cholecystectomy.     5. Suspected hepatic steatosis.     6. Nonspecific distal esophageal wall thickening may represent underlying esophagitis.     7. Lesser incidental findings as above.     SUBJECTIVE   Pt was alert and coop Plan/Recommendations: Dysphagia therapy; Aspiration precautions  Number of Visits to Meet Established Goals: 3  Follow Up Needed: Yes  SLP Follow-up Date: 04/16/18    Thank you for your referral.   If you have any questions, please contact [facility]

## 2018-04-15 NOTE — PROGRESS NOTES
Pulmonary/Critical Care Follow Up Note    HPI:   Vishnu Allan is a 78year old female with Patient presents with:  Dyspnea IKER SOB (respiratory)  Cough/URI      PCP Diana Alas MD  Admission Attending Floyd Soares MD    Hospital Day #5 injection 50 mL, 50 mL, Intravenous, PRN  •  Glucose-Vitamin C (DEX-4) 4-0.006 g chewable tab 4 tablet, 4 tablet, Oral, Q15 Min PRN  •  glucose (DEX4) oral liquid 15 g, 15 g, Oral, Q15 Min PRN  •  Insulin Aspart Pen (NOVOLOG) 100 UNIT/ML flexpen 1-11 Units

## 2018-04-15 NOTE — PHYSICAL THERAPY NOTE
PHYSICAL THERAPY TREATMENT NOTE - INPATIENT     Room Number: 516/227-I       Presenting Problem: Acute exacerbation of COPD, Acute on chronic respiratory failure    Problem List  Principal Problem:    COPD exacerbation ([address]  Active Problems:    Hypoxia Static Sitting: Good  Dynamic Sitting: Good           Static Standing: Fair  Dynamic Standing: Fair    ACTIVITY TOLERANCE  O2 Saturation at rest 97% and with activity 93%  Liter Patient End of Session: Call light within reach;RN aware of session/findings; All patient questions and concerns addressed; In bathroom - nursing staff aware    CURRENT GOALS     Goals to be met by: 4/20/18  Patient Goal Patient's self-stated goal is: ho

## 2018-04-15 NOTE — PROGRESS NOTES
[facility]    Progress Note    Earna Foil Patient Status:  Observation    1940 MRN [id number]   Location [facility] Attending Дмитрий Summers MD   Hosp Day # 5 PCP Yehuda Soulier, MD       SUBJECTIVE:  Feels be pneumonitis could have a similar appearance. Followup is recommended to document resolution. 3. Diffuse bronchial wall thickening, suggesting bronchitis. There may be aspiration. 4. Status post cholecystectomy. 5. Suspected hepatic steatosis.   6. Nonspe 100 UNIT/ML flexpen 1-11 Units 1-11 Units Subcutaneous TID CC and HS   DilTIAZem HCl ER Coated Beads (CARDIZEM CD) 24 hr cap 240 mg 240 mg Oral Nightly         Assessment  Patient Active Problem List:     Pulmonary emphysema (HCC)     Essential hypertensio monitor, with OP f/u of labs  - may be related to hep sq, ( will stop today )  - plan op nephrology f/u     Hyperlipidemia  -Continue statin     Prophylaxis  -SCDs while in bed     CODE STATUS  Full     Primary care physician  MD MADI Anguiano

## 2018-04-16 VITALS
DIASTOLIC BLOOD PRESSURE: 51 MMHG | WEIGHT: 126.13 LBS | OXYGEN SATURATION: 94 % | HEART RATE: 67 BPM | TEMPERATURE: 98 F | RESPIRATION RATE: 18 BRPM | SYSTOLIC BLOOD PRESSURE: 143 MMHG | BODY MASS INDEX: 23.21 KG/M2 | HEIGHT: 62 IN

## 2018-04-16 PROCEDURE — 99232 SBSQ HOSP IP/OBS MODERATE 35: CPT | Performed by: INTERNAL MEDICINE

## 2018-04-16 PROCEDURE — 99239 HOSP IP/OBS DSCHRG MGMT >30: CPT | Performed by: HOSPITALIST

## 2018-04-16 RX ORDER — LEVOFLOXACIN 750 MG/1
750 TABLET ORAL DAILY
Qty: 4 TABLET | Refills: 0 | Status: SHIPPED | OUTPATIENT
Start: 2018-04-16 | End: 2018-04-20

## 2018-04-16 RX ORDER — SENNA AND DOCUSATE SODIUM 50; 8.6 MG/1; MG/1
2 TABLET, FILM COATED ORAL 2 TIMES DAILY
Qty: 120 TABLET | Refills: 0 | Status: SHIPPED | OUTPATIENT
Start: 2018-04-16 | End: 2018-05-16

## 2018-04-16 RX ORDER — MAGNESIUM OXIDE 400 MG (241.3 MG MAGNESIUM) TABLET
400 TABLET ONCE
Status: COMPLETED | OUTPATIENT
Start: 2018-04-16 | End: 2018-04-16

## 2018-04-16 NOTE — PLAN OF CARE
Problem: Diabetes/Glucose Control  Goal: Glucose maintained within prescribed range  INTERVENTIONS:  - Monitor Blood Glucose as ordered  - Assess for signs and symptoms of hyperglycemia and hypoglycemia  - Administer ordered medications to maintain glucose injury  INTERVENTIONS:  - Assess pt frequently for physical needs  - Identify cognitive and physical deficits and behaviors that affect risk of falls.   - [place] fall precautions as indicated by assessment.  - Educate pt/family on patient safety includin Progressing      Problem: Patient Centered Care  Goal: Patient preferences are identified and integrated in the patient's plan of care  Interventions:  - What would you like us to know as we care for you? \" I live by myself.  My daughters are in the area\"

## 2018-04-16 NOTE — PLAN OF CARE
Problem: Diabetes/Glucose Control  Goal: Glucose maintained within prescribed range  INTERVENTIONS:  - Monitor Blood Glucose as ordered  - Assess for signs and symptoms of hyperglycemia and hypoglycemia  - Administer ordered medications to maintain glucose deficits and behaviors that affect risk of falls.   - [place] fall precautions as indicated by assessment.  - Educate pt/family on patient safety including physical limitations  - Instruct pt to call for assistance with activity based on assessment  - Mod daughters are in the area\".   - Provide timely, complete, and accurate information to patient/family  - Incorporate patient and family knowledge, values, beliefs, and cultural backgrounds into the planning and delivery of care  - Encourage patient/family t

## 2018-04-16 NOTE — PHYSICAL THERAPY NOTE
PHYSICAL THERAPY TREATMENT NOTE - INPATIENT     Room Number: 562/481-R       Presenting Problem: Acute exacerbation of COPD, Acute on chronic respiratory failure    Problem List  Principal Problem:    COPD exacerbation ([address]  Active Problems:    Hypoxia ABILITY STATUS  Gait Assessment   Gait Assistance: Supervision  Distance (ft): 2 x 200  Assistive Device: Rolling walker  Pattern: Within Functional Limits (forward flexed posture)  Stoop/Curb Assistance: Not tested  Comment : Patient ambulates slowly, cue

## 2018-04-16 NOTE — SLP NOTE
SPEECH DAILY NOTE - INPATIENT    ASSESSMENT & PLAN   ASSESSMENT  Patient seen to monitor tolerance of PO diet while sitting upright in chair with trials of current diet. Mastication was timely and complete.   Swallows were also timely and with adequate lar Progress   Goal #3 The patient will utilize compensatory strategies as outlined by  BSSE (clinical evaluation) including Slow rate, Small bites, Small sips, Multiple swallows, Alternate liquids/solids, No straws, Upright 90 degrees with mild assistance 90

## 2018-04-16 NOTE — PROGRESS NOTES
Pulmonary/Critical Care Follow Up Note    HPI:   Ayo Cochran is a 78year old female with Patient presents with:  Dyspnea IKER SOB (respiratory)  Cough/URI      PCP Dave Roa MD  Admission Attending Cecelia Grover MD    Hospital Day #6 Intravenous, PRN  •  Glucose-Vitamin C (DEX-4) 4-0.006 g chewable tab 4 tablet, 4 tablet, Oral, Q15 Min PRN  •  glucose (DEX4) oral liquid 15 g, 15 g, Oral, Q15 Min PRN  •  Insulin Aspart Pen (NOVOLOG) 100 UNIT/ML flexpen 1-11 Units, 1-11 Units, Subcutaneo

## 2018-04-16 NOTE — CM/SW NOTE
RN/Leatha told SW that pt is cleared to discharge today. Madeline from [facility] told SW that they are able to accept pt today at 530pm. RN stated that pt will need ambulance transport due to [address].  [facility] and arranged ambulance transpo

## 2018-04-16 NOTE — CM/SW NOTE
Addend 326p: SW met w/ pt who stated she is agreeable w/ alternative SNF's being made and agreeable w/ [facility] and Liliane[facility], due to being close to the [facility]. SW made referral to [facility] and Juan Daniel[facility].  SW notified Gregg/Maddie Preciado and Maxim/Juan Daniel

## 2018-04-16 NOTE — DISCHARGE SUMMARY
[facility]    Discharge Summary    Daisha Leblanc Patient Status:  Inpatient    1940 MRN [id number]   Location [address] [facility] Attending Keyla Pinzon MD   Hosp Day # 6 PCP Helane Lundborg, MD     Date of Admission:  cooperative  Pulmonary:  Decreased air entry  Cardiovascular: S1, S2 normal, no murmur, click, rub or gallop, regular rate and rhythm  Abdominal: soft, non-tender; bowel sounds normal; no masses,  no organomegaly  Extremities: extremities normal, atraumati pressure suboptimally controlled, will resume antihypertensives and evaluate response  -Stopped ace with hyperkalemia  -norvasc increased dose     Hyperkalemia  - suddenly increased after hospitalization  - plan stop ace  - plan to continue to monitor, wit CHANGE how you take these medications      Instructions Prescription details   DilTIAZem HCl ER Coated Beads 240 MG Cp24  Commonly known as:  CARTIA XT  What changed:  when to take this      Take 1 capsule (240 mg total) by mouth once daily.    Quantity: PCP in 1 week    Follow up Labs: bmp- will need op nephrology evla     Other Discharge Instructions: f/u with pcp, nephrology, pulmonologist and copd clinic    [facility], MD  4/16/2018  10:13 AM    > 35 min

## 2018-04-16 NOTE — PROGRESS NOTES
NURSING DISCHARGE NOTE    Discharged Nursing home via Ambulance. Accompanied by Support staff  Belongings Taken by patient/family   Report given to ragini.pt's own o2 tank send with pt by ambulence,informed rehab about this.

## 2018-04-17 ENCOUNTER — TELEPHONE (OUTPATIENT)
Dept: MEDSURG UNIT | Facility: HOSPITAL | Age: 78
End: 2018-04-17

## 2018-04-17 ENCOUNTER — SNF VISIT (OUTPATIENT)
Dept: INTERNAL MEDICINE CLINIC | Facility: SKILLED NURSING FACILITY | Age: 78
End: 2018-04-17

## 2018-04-17 DIAGNOSIS — E11.9 TYPE 2 DIABETES MELLITUS WITHOUT COMPLICATION, WITHOUT LONG-TERM CURRENT USE OF INSULIN (HCC): ICD-10-CM

## 2018-04-17 DIAGNOSIS — R53.1 WEAKNESS: ICD-10-CM

## 2018-04-17 DIAGNOSIS — J18.9 PNEUMONIA DUE TO INFECTIOUS ORGANISM, UNSPECIFIED LATERALITY, UNSPECIFIED PART OF LUNG: ICD-10-CM

## 2018-04-17 DIAGNOSIS — J44.1 COPD EXACERBATION (HCC): ICD-10-CM

## 2018-04-17 PROCEDURE — 99310 SBSQ NF CARE HIGH MDM 45: CPT | Performed by: NURSE PRACTITIONER

## 2018-04-17 NOTE — PROGRESS NOTES
HPI: Keon Mcneill  Is a 79 yo female with a past medical history significant for COPD on home O2, diabetes and hypertension. She was admitted to [address] [place] [address] with acute exacerbation of COPD. She was treated with IV solumedrol and transitioned to oral steroids.  C mucous membranes pink and moist, PERRLA, EOMI, sclera anicteric, conjunctiva normal. Neck supple no JVD  RESPIRATORY: distant and diminished lung sounds , no wheezing , 4L o2 per NC 02sat 95%  CARDIOVASCULAR: s1 s2 RRR  ABDOMEN:  normal active BS+, soft, n

## 2018-04-18 ENCOUNTER — SNF VISIT (OUTPATIENT)
Dept: INTERNAL MEDICINE CLINIC | Facility: SKILLED NURSING FACILITY | Age: 78
End: 2018-04-18

## 2018-04-18 DIAGNOSIS — I10 HYPERTENSION, UNSPECIFIED TYPE: Primary | ICD-10-CM

## 2018-04-19 ENCOUNTER — TELEPHONE (OUTPATIENT)
Dept: MEDSURG UNIT | Facility: HOSPITAL | Age: 78
End: 2018-04-19

## 2018-04-20 ENCOUNTER — OFFICE VISIT (OUTPATIENT)
Dept: CARDIOLOGY CLINIC | Facility: HOSPITAL | Age: 78
End: 2018-04-20
Attending: INTERNAL MEDICINE
Payer: MEDICARE

## 2018-04-20 VITALS
HEART RATE: 74 BPM | DIASTOLIC BLOOD PRESSURE: 72 MMHG | WEIGHT: 126.19 LBS | SYSTOLIC BLOOD PRESSURE: 148 MMHG | BODY MASS INDEX: 23 KG/M2 | OXYGEN SATURATION: 96 %

## 2018-04-20 PROCEDURE — 99214 OFFICE O/P EST MOD 30 MIN: CPT | Performed by: CLINICAL NURSE SPECIALIST

## 2018-04-20 PROCEDURE — 99211 OFF/OP EST MAY X REQ PHY/QHP: CPT | Performed by: CLINICAL NURSE SPECIALIST

## 2018-04-20 NOTE — PROGRESS NOTES
[facility] Patient Status:  Outpatient    1940 MRN [id number]   Location Dede Aase, MD Dr. Salvadore Marking is a 78year old female who presents to clinic f ventricle: The cavity size was normal. Systolic function was normal. The estimated ejection fraction was 65%. Wall motion was normal; there were     no regional wall motion abnormalities.  Doppler parameters are consistent with abnormal left ventricular rel palpitations. Denies fevers, chills & night sweats. Breath sounds are diminished throughout. No JVD.   Weights have been stable since discharge from the hospital.  Left lower extremity with trace–+1 edema, right lower extremity with no edema–patient stat

## 2018-04-20 NOTE — PATIENT INSTRUCTIONS
Consider the addition of an additional antihypertensive agent  Continue all other medications  Appointment with Dr. Peggy Mccarty 4/30  Appointment with Dr. Julianna Connors 5/10  Return to clinic as directed or needed

## 2018-04-20 NOTE — PROGRESS NOTES
Patient was discharged to rehab on norvasc and diltiazem  Norvasc discontinued  Discussed with Dr. Shane Estevez  Monitor blood pressures

## 2018-04-23 PROCEDURE — 99308 SBSQ NF CARE LOW MDM 20: CPT | Performed by: INTERNAL MEDICINE

## 2018-04-25 PROCEDURE — 99308 SBSQ NF CARE LOW MDM 20: CPT | Performed by: INTERNAL MEDICINE

## 2018-04-26 ENCOUNTER — SNF VISIT (OUTPATIENT)
Dept: INTERNAL MEDICINE CLINIC | Facility: SKILLED NURSING FACILITY | Age: 78
End: 2018-04-26

## 2018-04-26 DIAGNOSIS — J44.1 COPD EXACERBATION (HCC): ICD-10-CM

## 2018-04-26 DIAGNOSIS — R53.1 WEAKNESS: ICD-10-CM

## 2018-04-26 PROCEDURE — 99308 SBSQ NF CARE LOW MDM 20: CPT | Performed by: NURSE PRACTITIONER

## 2018-04-26 NOTE — PROGRESS NOTES
HPI: Sun Morales  Is a 79 yo female with a past medical history significant for COPD on home O2, diabetes and hypertension. She was admitted to [address] [place] [address] with acute exacerbation of COPD. She was treated with IV solumedrol and transitioned to oral steroids.  C appropriate        ASSESSMENT/PLAN  Stable, weaned down to 0.5L oxygen   Continue pt/ot, estimated DC to home 4/29/18 with HH with checkins from family           Acute exacerbation of COPD  -s/p IV solumedrol inpatient now on oral prednisone taper  -Luis Enrique

## 2018-04-27 ENCOUNTER — SNF/IP PROF CHARGE ONLY (OUTPATIENT)
Dept: INTERNAL MEDICINE CLINIC | Facility: CLINIC | Age: 78
End: 2018-04-27

## 2018-04-27 DIAGNOSIS — E11.9 TYPE 2 DIABETES MELLITUS WITHOUT COMPLICATION, WITHOUT LONG-TERM CURRENT USE OF INSULIN (HCC): ICD-10-CM

## 2018-04-27 DIAGNOSIS — J44.0 CHRONIC OBSTRUCTIVE PULMONARY DISEASE WITH ACUTE LOWER RESPIRATORY INFECTION (HCC): ICD-10-CM

## 2018-04-27 DIAGNOSIS — I10 ESSENTIAL HYPERTENSION WITH GOAL BLOOD PRESSURE LESS THAN 130/85: ICD-10-CM

## 2018-04-30 ENCOUNTER — PATIENT OUTREACH (OUTPATIENT)
Dept: CASE MANAGEMENT | Age: 78
End: 2018-04-30

## 2018-04-30 NOTE — PROGRESS NOTES
Initial Post Discharge Follow Up   Discharge Date: 4/16/18  Contact Date: 4/30/2018    Consent Verification:  Assessment Completed With: Patient  HIPAA Verified?   Yes    Discharge Dx:   S/p LON for acute exacerbation of COPD    General:   • How have you (20 mg total) by mouth nightly.  Disp: 90 tablet Rfl: 3     • When you were leaving the hospital were any medication changes discussed with you? yes  • If you were prescribed a new medication:   o Was the new medication’s purpose explained? yes  o Do you ha Discharge Summary, Discharge medications reviewed/discussed/and reconciled with patient, and orders reviewed and discussed. Any changes or updates to medications and or orders sent to PCP.

## 2018-05-01 ENCOUNTER — TELEPHONE (OUTPATIENT)
Dept: INTERNAL MEDICINE CLINIC | Facility: CLINIC | Age: 78
End: 2018-05-01

## 2018-05-01 NOTE — TELEPHONE ENCOUNTER
Gale Allison from [facility] called, there is a delay in admitting patient to home care per patient request she is scheduled for next Tuesday.   Orders will be faxed over for signature

## 2018-05-02 NOTE — PROGRESS NOTES
Call received from Pt and her daughter requesting to cancel this afternoons apt d/t transportation issues. TCM apt rescheduled for Friday 5/4/18 w ARNOLDO Ruiz. Still w in TCM range.

## 2018-05-03 RX ORDER — SITAGLIPTIN AND METFORMIN HYDROCHLORIDE 50; 1000 MG/1; MG/1
TABLET, FILM COATED ORAL
Qty: 180 TABLET | Refills: 0 | Status: SHIPPED | OUTPATIENT
Start: 2018-05-03 | End: 2018-05-04

## 2018-05-03 NOTE — TELEPHONE ENCOUNTER
Diabetes Medication Protocol Passed5/2 5:51 PM   Creatinine in the past 12 months    Last A1C < 7.5 and within past 6 months    Last serum creatinine result < 1.5    Appointment in past 6 or next 3 months     Medication refilled for 90 days per protocol.

## 2018-05-04 ENCOUNTER — OFFICE VISIT (OUTPATIENT)
Dept: INTERNAL MEDICINE CLINIC | Facility: CLINIC | Age: 78
End: 2018-05-04

## 2018-05-04 VITALS
WEIGHT: 124.13 LBS | TEMPERATURE: 98 F | BODY MASS INDEX: 22.84 KG/M2 | HEART RATE: 62 BPM | SYSTOLIC BLOOD PRESSURE: 112 MMHG | RESPIRATION RATE: 18 BRPM | HEIGHT: 62 IN | DIASTOLIC BLOOD PRESSURE: 52 MMHG

## 2018-05-04 DIAGNOSIS — J44.1 COPD EXACERBATION (HCC): Primary | ICD-10-CM

## 2018-05-04 DIAGNOSIS — I10 ESSENTIAL HYPERTENSION: ICD-10-CM

## 2018-05-04 DIAGNOSIS — E11.9 TYPE 2 DIABETES MELLITUS WITHOUT COMPLICATION, WITHOUT LONG-TERM CURRENT USE OF INSULIN (HCC): ICD-10-CM

## 2018-05-04 PROCEDURE — 99213 OFFICE O/P EST LOW 20 MIN: CPT | Performed by: PHYSICIAN ASSISTANT

## 2018-05-04 PROCEDURE — 1111F DSCHRG MED/CURRENT MED MERGE: CPT | Performed by: PHYSICIAN ASSISTANT

## 2018-05-04 RX ORDER — DILTIAZEM HYDROCHLORIDE 240 MG/1
CAPSULE, EXTENDED RELEASE ORAL
Qty: 90 CAPSULE | Refills: 0 | Status: SHIPPED | OUTPATIENT
Start: 2018-05-04 | End: 2018-09-26

## 2018-05-04 NOTE — PROGRESS NOTES
HPI:    Vishnu Allan is a 78year old female here today for hospital follow up.    She was discharged from Inpatient hospital[facility]  to SNF  Admission Date: 4/8/18   Discharge Date: 4/16/18  Hospital Discharge Diagnoses (since 4/4/2018) week. Finally getting strength back and feeling much better. Has not needed home oxygen since discharge from SNF. O2 level at home averages 91-92%. No wheezing or shortness of breath. Cough is improving. No fever or chills.  Not checking blood pressure at h sore throat. Eyes: Negative for visual disturbance. Respiratory: Positive for cough. Negative for shortness of breath and wheezing. Cardiovascular: Negative for chest pain, palpitations and leg swelling.    Gastrointestinal: Negative for nausea, vom Musculoskeletal: Normal range of motion. She exhibits no tenderness. Lymphadenopathy:     She has no cervical adenopathy. Neurological: She is alert and oriented to person, place, and time. Skin: Skin is warm and dry. No rash noted.    Psychiatric: Transitional Care Management Certification:  I certify that the following are true:  Communication with the patient was made within 2 business days of discharge on date above   Medical Decision Making- Based on service period of discharge to 30 days:

## 2018-05-10 ENCOUNTER — HOSPITAL ENCOUNTER (OUTPATIENT)
Dept: GENERAL RADIOLOGY | Facility: HOSPITAL | Age: 78
Discharge: HOME | End: 2018-05-10
Attending: INTERNAL MEDICINE | Admitting: INTERNAL MEDICINE
Payer: MEDICARE

## 2018-05-10 ENCOUNTER — OFFICE VISIT (OUTPATIENT)
Dept: PULMONOLOGY | Facility: CLINIC | Age: 78
End: 2018-05-10

## 2018-05-10 VITALS
HEIGHT: 62 IN | BODY MASS INDEX: 22.82 KG/M2 | OXYGEN SATURATION: 93 % | DIASTOLIC BLOOD PRESSURE: 57 MMHG | RESPIRATION RATE: 19 BRPM | WEIGHT: 124 LBS | SYSTOLIC BLOOD PRESSURE: 121 MMHG | HEART RATE: 66 BPM

## 2018-05-10 DIAGNOSIS — J18.9 COMMUNITY ACQUIRED PNEUMONIA, UNSPECIFIED LATERALITY: ICD-10-CM

## 2018-05-10 DIAGNOSIS — J43.2 CENTRILOBULAR EMPHYSEMA (HCC): Primary | ICD-10-CM

## 2018-05-10 DIAGNOSIS — J30.1 SEASONAL ALLERGIC RHINITIS DUE TO POLLEN: ICD-10-CM

## 2018-05-10 DIAGNOSIS — J96.12 CHRONIC RESPIRATORY FAILURE WITH HYPERCAPNIA (HCC): ICD-10-CM

## 2018-05-10 PROBLEM — R09.02 HYPOXIA: Status: RESOLVED | Noted: 2017-06-18 | Resolved: 2018-05-10

## 2018-05-10 PROCEDURE — 99213 OFFICE O/P EST LOW 20 MIN: CPT | Performed by: INTERNAL MEDICINE

## 2018-05-10 PROCEDURE — 71046 X-RAY EXAM CHEST 2 VIEWS: CPT | Performed by: INTERNAL MEDICINE

## 2018-05-10 PROCEDURE — G0463 HOSPITAL OUTPT CLINIC VISIT: HCPCS | Performed by: INTERNAL MEDICINE

## 2018-05-10 NOTE — PROGRESS NOTES
Pulmonary Follow Up Note    HPI:   Meli Palomares is a 78year old female with Patient presents with:   Follow - Up: Oxygen concern    Hernan Dietrich MD    Pt states viral URI and PNA  Admit 9/9-4/16  Now follows up    S/p rehab   - \"almost killed m Marital status:             Spouse name:                       Years of education:                 Number of children:               Social History Main Topics    Smoking status: Former Smoker disease  GUILLERMO- Short acting antimuscarinic agent (ipratropium)  TBM- Tracheobronchomalacia  VCD- Vocal cord dysfunction

## 2018-05-11 ENCOUNTER — TELEPHONE (OUTPATIENT)
Dept: PULMONOLOGY | Facility: CLINIC | Age: 78
End: 2018-05-11

## 2018-05-11 NOTE — TELEPHONE ENCOUNTER
----- Message from Caryle Parsons, MD sent at 5/10/2018 11:40 PM CDT -----  Please let her know that her chest x-ray was improved and back to baseline from her last admission.

## 2018-05-14 NOTE — TELEPHONE ENCOUNTER
Pt was transferred from phone room requesting results, pt read results as detailed below, pt verbalized understanding.

## 2018-05-21 ENCOUNTER — MED REC SCAN ONLY (OUTPATIENT)
Dept: INTERNAL MEDICINE CLINIC | Facility: CLINIC | Age: 78
End: 2018-05-21

## 2018-05-24 ENCOUNTER — OFFICE VISIT (OUTPATIENT)
Dept: INTERNAL MEDICINE CLINIC | Facility: CLINIC | Age: 78
End: 2018-05-24

## 2018-05-24 VITALS
WEIGHT: 128 LBS | RESPIRATION RATE: 16 BRPM | SYSTOLIC BLOOD PRESSURE: 138 MMHG | BODY MASS INDEX: 23.55 KG/M2 | HEART RATE: 65 BPM | HEIGHT: 62 IN | DIASTOLIC BLOOD PRESSURE: 75 MMHG

## 2018-05-24 DIAGNOSIS — I10 ESSENTIAL HYPERTENSION: ICD-10-CM

## 2018-05-24 DIAGNOSIS — E78.2 MIXED HYPERLIPIDEMIA: ICD-10-CM

## 2018-05-24 DIAGNOSIS — E11.9 TYPE 2 DIABETES MELLITUS WITHOUT COMPLICATION, WITHOUT LONG-TERM CURRENT USE OF INSULIN (HCC): ICD-10-CM

## 2018-05-24 DIAGNOSIS — J44.1 COPD EXACERBATION (HCC): Primary | ICD-10-CM

## 2018-05-24 PROCEDURE — G0463 HOSPITAL OUTPT CLINIC VISIT: HCPCS | Performed by: INTERNAL MEDICINE

## 2018-05-24 PROCEDURE — 99214 OFFICE O/P EST MOD 30 MIN: CPT | Performed by: INTERNAL MEDICINE

## 2018-05-24 PROCEDURE — 1111F DSCHRG MED/CURRENT MED MERGE: CPT | Performed by: INTERNAL MEDICINE

## 2018-05-24 RX ORDER — LOSARTAN POTASSIUM 50 MG/1
50 TABLET ORAL DAILY
Qty: 90 TABLET | Refills: 3 | Status: SHIPPED | OUTPATIENT
Start: 2018-05-24 | End: 2018-10-03

## 2018-05-24 RX ORDER — FLUTICASONE PROPIONATE AND SALMETEROL 250; 50 UG/1; UG/1
1 POWDER RESPIRATORY (INHALATION) EVERY 12 HOURS SCHEDULED
COMMUNITY
End: 2018-06-26 | Stop reason: ALTCHOICE

## 2018-05-24 NOTE — PROGRESS NOTES
HPI:    Alf Marte is a 78year old female here today for hospital follow up.    She was discharged from {Discharged from which location:7092} to {Discharged to which location:7093::\"Home\"}   Admit Date: ***  Discharge Date: [facility] Discharge Inhalation Aero Soln Inhale 2 puffs into the lungs every 4 (four) hours as needed for Wheezing. Pravastatin Sodium 20 MG Oral Tab Take 1 tablet (20 mg total) by mouth nightly. No current facility-administered medications on file prior to visit. (58.1 kg)   BMI 23.41 kg/m²    GENERAL: well developed, well nourished, in no apparent distress  SKIN: no rashes, no suspicious lesions  HEENT: atraumatic, normocephalic, ears and throat are clear  EYES: PERRLA, EOMI, conjunctiva are clear  NECK: supple, n

## 2018-05-24 NOTE — PROGRESS NOTES
HPI:   1. Chronic obstructive pulmonary disease with acute lower respiratory infection ([facility] [address]    Has been having breathing issues. Recently hospitalized with pneumonia. Feeling much better. Appetite good. Wears oxygen overnite. Nose gets quite dry at times. Cholesterol (mg/dL)   Date Value   06/18/2017 72   10/09/2016 57   ----------  HDL CHOLESTEROL (P) (mg/dL)   Date Value   09/26/2015 54   05/20/2014 62 (H)   08/22/2012 56   ----------  LDL Cholesterol (mg/dL)   Date Value   06/18/2017 51   10/09/2016 73 Packs/day: 1.00      Years: 20.00        Types: Cigarettes     Quit date: 1/1/1985  Smokeless tobacco: Never Used                      Alcohol use: Yes              Comment: 1 glass wine socially    Exercise:  twice per week.   Die daily. Recommendations are: continue present meds, lose wgt with carbohydrate controlled diet and exercise, refer to Ophthalmology this month, check feet daily.     3. Essential hypertension with goal blood pressure less than 130/85    Patient instructed t

## 2018-06-09 RX ORDER — PRAVASTATIN SODIUM 20 MG
TABLET ORAL
Qty: 90 TABLET | Refills: 0 | Status: SHIPPED | OUTPATIENT
Start: 2018-06-09 | End: 2018-09-26

## 2018-06-09 RX ORDER — SITAGLIPTIN AND METFORMIN HYDROCHLORIDE 50; 1000 MG/1; MG/1
TABLET, FILM COATED ORAL
Qty: 180 TABLET | Refills: 0 | Status: SHIPPED | OUTPATIENT
Start: 2018-06-09 | End: 2018-06-26 | Stop reason: ALTCHOICE

## 2018-06-09 NOTE — TELEPHONE ENCOUNTER
Refill passed per [facility] protocol.   Diabetes Medications  Protocol Criteria:  · Appointment scheduled in the past 6 months or the next 3 months  · A1C < 7.5 in the past 6 months  · Creatinine in the past 12 months  · Creatinine result < 1.5   [facility] [address], [place]    Office Visit    1 month ago     Salena Mills., NP    Office Visit    2 months ago Administrative encounter    [facility]

## 2018-06-10 ENCOUNTER — HOSPITAL ENCOUNTER (EMERGENCY)
Facility: HOSPITAL | Age: 78
Discharge: HOME | End: 2018-06-10
Attending: EMERGENCY MEDICINE
Payer: MEDICARE

## 2018-06-10 ENCOUNTER — HOSPITAL ENCOUNTER (OUTPATIENT)
Age: 78
Discharge: ANOTHER HOSPITAL | End: 2018-06-10
Attending: EMERGENCY MEDICINE
Payer: MEDICARE

## 2018-06-10 ENCOUNTER — APPOINTMENT (OUTPATIENT)
Dept: GENERAL RADIOLOGY | Facility: HOSPITAL | Age: 78
End: 2018-06-10
Attending: EMERGENCY MEDICINE
Payer: MEDICARE

## 2018-06-10 ENCOUNTER — APPOINTMENT (OUTPATIENT)
Dept: ULTRASOUND IMAGING | Facility: HOSPITAL | Age: 78
End: 2018-06-10
Attending: EMERGENCY MEDICINE
Payer: MEDICARE

## 2018-06-10 VITALS
HEART RATE: 94 BPM | DIASTOLIC BLOOD PRESSURE: 95 MMHG | OXYGEN SATURATION: 98 % | RESPIRATION RATE: 16 BRPM | SYSTOLIC BLOOD PRESSURE: 142 MMHG | TEMPERATURE: 97 F

## 2018-06-10 VITALS
TEMPERATURE: 99 F | HEART RATE: 82 BPM | OXYGEN SATURATION: 94 % | BODY MASS INDEX: 23.55 KG/M2 | SYSTOLIC BLOOD PRESSURE: 168 MMHG | DIASTOLIC BLOOD PRESSURE: 71 MMHG | WEIGHT: 128 LBS | RESPIRATION RATE: 18 BRPM | HEIGHT: 62 IN

## 2018-06-10 DIAGNOSIS — M54.31 SCIATICA OF RIGHT SIDE: Primary | ICD-10-CM

## 2018-06-10 DIAGNOSIS — M54.31 SCIATICA OF RIGHT SIDE: ICD-10-CM

## 2018-06-10 DIAGNOSIS — M79.89 LEFT LEG SWELLING: Primary | ICD-10-CM

## 2018-06-10 PROCEDURE — 99212 OFFICE O/P EST SF 10 MIN: CPT

## 2018-06-10 PROCEDURE — 99284 EMERGENCY DEPT VISIT MOD MDM: CPT

## 2018-06-10 PROCEDURE — 72100 X-RAY EXAM L-S SPINE 2/3 VWS: CPT | Performed by: EMERGENCY MEDICINE

## 2018-06-10 PROCEDURE — 93971 EXTREMITY STUDY: CPT | Performed by: EMERGENCY MEDICINE

## 2018-06-10 RX ORDER — NAPROXEN 500 MG/1
500 TABLET ORAL 2 TIMES DAILY PRN
Qty: 14 TABLET | Refills: 0 | Status: SHIPPED | OUTPATIENT
Start: 2018-06-10 | End: 2018-06-17

## 2018-06-10 NOTE — ED INITIAL ASSESSMENT (HPI)
Patient comes in with right leg pain from her groin down to her ankle. She has a history of chronic leg pain, but states the pain she is experiencing today is new. She was recently in the hospital with pneumonia and had physical therapy inpatient.  She thin

## 2018-06-10 NOTE — ED PROVIDER NOTES
Patient Seen in: [facility]    History   Patient presents with:  Deep Vein Thrombosis (cardiovascular)    Stated Complaint: Here from [address] [place] [address] for left leg swelling. Dx with sciatica of right side.  Here for \"U*    HPI    Patient is None (Room air)    Current:BP (!) 174/62   Pulse 93   Temp 98.6 °F (37 °C) (Oral)   Resp 18   Ht 157.5 cm (5' 2\")   Wt 58.1 kg   SpO2 96%   BMI 23.41 kg/m²         Physical Exam    Constitutional: Oriented to person, place, and time.  Appears well-develope no disposition time on file for this visit.     Follow-up:  Ilda Ball MD  [address]  [phone]    Schedule an appointment as soon as possible for a visit in 2 days      We recommend that you schedule follow up care

## 2018-06-10 NOTE — ED NOTES
Patient referred to the ER for further evaluation. She will go to [address] [place] [address].

## 2018-06-10 NOTE — ED PROVIDER NOTES
Patient Seen in: [address]    History   Patient presents with:  Leg Pain    Stated Complaint: PAIN IN RIGHT LEG     HPI    Patient is a 56-year-old female with past history of asthma, COPD, diabetes, recent 2 week admis signs reviewed. All other systems reviewed and negative except as noted above.     Physical Exam   ED Triage Vitals [06/10/18 1241]  BP: (!) 142/95  Pulse: 94  Resp: 16  Temp: 97 °F (36.1 °C)  Temp src: Oral  SpO2: 98 %  O2 Device: None (Room air)    C

## 2018-06-13 ENCOUNTER — TELEPHONE (OUTPATIENT)
Dept: PULMONOLOGY | Facility: CLINIC | Age: 78
End: 2018-06-13

## 2018-06-13 NOTE — TELEPHONE ENCOUNTER
Bashir Castro/VICTORINO requesting clinical notes from office visit on 5/10/18.  Please fax to [phone]

## 2018-06-13 NOTE — TELEPHONE ENCOUNTER
Left OV Note 5/10 in outgoing faxes to be sent as requested Jesus/VICTORINO at fax # provided below.

## 2018-06-22 ENCOUNTER — TELEPHONE (OUTPATIENT)
Dept: INTERNAL MEDICINE CLINIC | Facility: CLINIC | Age: 78
End: 2018-06-22

## 2018-06-22 NOTE — TELEPHONE ENCOUNTER
Dr. Leah Forrest office called would like to discuss pt treatment   Doc is in the office after 10am  Please advise

## 2018-06-26 ENCOUNTER — OFFICE VISIT (OUTPATIENT)
Dept: INTERNAL MEDICINE CLINIC | Facility: CLINIC | Age: 78
End: 2018-06-26

## 2018-06-26 VITALS
WEIGHT: 128 LBS | HEART RATE: 49 BPM | BODY MASS INDEX: 23.55 KG/M2 | OXYGEN SATURATION: 94 % | TEMPERATURE: 98 F | DIASTOLIC BLOOD PRESSURE: 67 MMHG | SYSTOLIC BLOOD PRESSURE: 126 MMHG | HEIGHT: 62 IN

## 2018-06-26 DIAGNOSIS — M48.061 SPINAL STENOSIS OF LUMBAR REGION, UNSPECIFIED WHETHER NEUROGENIC CLAUDICATION PRESENT: Primary | ICD-10-CM

## 2018-06-26 PROCEDURE — 99213 OFFICE O/P EST LOW 20 MIN: CPT | Performed by: PHYSICIAN ASSISTANT

## 2018-06-26 RX ORDER — HYDROCODONE BITARTRATE AND ACETAMINOPHEN 5; 325 MG/1; MG/1
TABLET ORAL
Refills: 0 | COMMUNITY
Start: 2018-06-23 | End: 2018-08-01

## 2018-06-26 RX ORDER — PREDNISONE 10 MG/1
TABLET ORAL
COMMUNITY
Start: 2018-06-15 | End: 2018-08-01

## 2018-06-26 NOTE — PROGRESS NOTES
HPI:    Patient ID: Cady Escobar is a 78year old female. HPI   Patient presents today for follow-up from ER. She was seen on 6/10/18 for a sided sciatica and leg swelling.   X-ray L-spine shows mild scoliosis, diffuse degenerative disc disease, dege Negative for sleep disturbance.               Current Outpatient Prescriptions:  predniSONE 10 MG Oral Tab  Disp:  Rfl:    HYDROcodone-acetaminophen 5-325 MG Oral Tab  Disp:  Rfl: 0   PRAVASTATIN SODIUM 20 MG Oral Tab TAKE 1 TABLET(20 MG) BY MOUTH EVERY NIG L3-L4 and L4-L5, worse on the right side. Spoke to orthopedics, Dr. Edita Huitron, during office visit who has seen the patient and recommends referral to pain clinic for cortisone injection. Referral generated.  Also discussed starting a course of physical the

## 2018-06-27 ENCOUNTER — TELEPHONE (OUTPATIENT)
Dept: OTHER | Age: 78
End: 2018-06-27

## 2018-06-27 NOTE — TELEPHONE ENCOUNTER
Spoke to patient over the phone. She is still having back pain. Prednisone helped but she finished it. Advised to take Norco as needed and can also consider Advil 1-2 tablets every 8 hours as needed. Heat/ice application and gentle stretching and activity. She has an appointment with pain clinic on July 19 and they will call her if any cancellations occur and get her in sooner.

## 2018-06-27 NOTE — TELEPHONE ENCOUNTER
Pt contacts clinic, states she was seen yesterday and viewed some imaging on a disc with JUAN CARLOS Carballo. Has additional questions, would not elaborate to FLOYD Ahumada please call.

## 2018-07-06 ENCOUNTER — TELEPHONE (OUTPATIENT)
Dept: PAIN CLINIC | Facility: HOSPITAL | Age: 78
End: 2018-07-06

## 2018-07-06 NOTE — TELEPHONE ENCOUNTER
BRIAN ESCOBAR [facility] REP.  HAS PT  DARSHANA HUA WHO HAS APPT WITH ARN[facility]FO ON 7/18  KENYON WOULD LIKE US TO GET HER IN ASAP

## 2018-07-18 ENCOUNTER — OFFICE VISIT (OUTPATIENT)
Dept: PAIN CLINIC | Facility: HOSPITAL | Age: 78
End: 2018-07-18
Attending: PHYSICIAN ASSISTANT
Payer: MEDICARE

## 2018-07-18 VITALS
HEIGHT: 62 IN | HEART RATE: 66 BPM | BODY MASS INDEX: 23.55 KG/M2 | SYSTOLIC BLOOD PRESSURE: 109 MMHG | RESPIRATION RATE: 18 BRPM | DIASTOLIC BLOOD PRESSURE: 56 MMHG | WEIGHT: 128 LBS

## 2018-07-18 DIAGNOSIS — M48.061 SPINAL STENOSIS OF LUMBAR REGION, UNSPECIFIED WHETHER NEUROGENIC CLAUDICATION PRESENT: ICD-10-CM

## 2018-07-18 PROCEDURE — 99201 HC OUTPT EVAL AND MGNT NEW PT LEVEL 1: CPT

## 2018-07-18 NOTE — CHRONIC PAIN
Initial Consultation Note      HISTORY OF PRESENT ILLNESS:  Philip García is a 78year old old female referred to the pain clinic  for evaluation treatment of her low back pain which radiates into her right groin and right anterior thigh catheter was a v Surgical History:  No date: COLECTOMY      Comment: colon resection  No date: INNER EAR SURGERY PROC UNLISTED      Comment: mastoidectomy    REVIEW OF SYSTEMS:   A comprehensive review of systems was negative except for:   MEDICAL HISTORY:  Patient Active General: Alert and oriented x3  Affect:  NAD  Head: normocephalic, atraumatic  Eyes: anicteric; no injection  Chest: S1, S2, RRR  Respiratory: CTAB  Gait: Antalgic; cane user - Yes  Spine: Normal    ROM:   Lumbar spine  Flexion  decrease  Extension dec endplate edema at that level     PLAN:  RECOMMENDATIONS:   epidural steroid injection right transforaminal L3-4  Comprehensive analgesic plan was formulated. Conservative vs. Aggressive measures were discussed at length including pharmacotherapy (eg.  Anti-

## 2018-07-18 NOTE — PROGRESS NOTES
07/18/18   PRESENTS AMBULATORY;TO CPM;  NEW CONSULT C/O OF RLBP INTO THE RT GROIN;   RATED 7/10, TINGLING TO LEFT 5TH DIGIT, WEAKNESS TO BLE WITH HX OF SPRAINED ANKLE THREE DAYS AGO. EXAMINED BY DR. ARREDONDO. REFER TO DICTATION OF POC.

## 2018-07-19 ENCOUNTER — DOCUMENTATION ONLY (OUTPATIENT)
Dept: PAIN CLINIC | Facility: HOSPITAL | Age: 78
End: 2018-07-19

## 2018-08-01 ENCOUNTER — HOSPITAL ENCOUNTER (OUTPATIENT)
Dept: GENERAL RADIOLOGY | Facility: HOSPITAL | Age: 78
Discharge: HOME | End: 2018-08-01
Attending: INTERNAL MEDICINE | Admitting: INTERNAL MEDICINE
Payer: MEDICARE

## 2018-08-01 ENCOUNTER — OFFICE VISIT (OUTPATIENT)
Dept: INTERNAL MEDICINE CLINIC | Facility: CLINIC | Age: 78
End: 2018-08-01
Payer: MEDICARE

## 2018-08-01 ENCOUNTER — HOSPITAL ENCOUNTER (OUTPATIENT)
Dept: GENERAL RADIOLOGY | Facility: HOSPITAL | Age: 78
Discharge: HOME | End: 2018-08-01
Attending: INTERNAL MEDICINE
Payer: MEDICARE

## 2018-08-01 VITALS
RESPIRATION RATE: 16 BRPM | WEIGHT: 133 LBS | HEIGHT: 62 IN | BODY MASS INDEX: 24.48 KG/M2 | SYSTOLIC BLOOD PRESSURE: 160 MMHG | DIASTOLIC BLOOD PRESSURE: 80 MMHG | HEART RATE: 61 BPM

## 2018-08-01 DIAGNOSIS — S99.912A INJURY OF LEFT ANKLE, INITIAL ENCOUNTER: ICD-10-CM

## 2018-08-01 DIAGNOSIS — E11.9 TYPE 2 DIABETES MELLITUS WITHOUT COMPLICATION, WITHOUT LONG-TERM CURRENT USE OF INSULIN (HCC): ICD-10-CM

## 2018-08-01 DIAGNOSIS — I10 ESSENTIAL HYPERTENSION WITH GOAL BLOOD PRESSURE LESS THAN 130/85: ICD-10-CM

## 2018-08-01 DIAGNOSIS — S99.912A INJURY OF LEFT ANKLE, INITIAL ENCOUNTER: Primary | ICD-10-CM

## 2018-08-01 DIAGNOSIS — J44.1 COPD EXACERBATION (HCC): ICD-10-CM

## 2018-08-01 PROCEDURE — 73610 X-RAY EXAM OF ANKLE: CPT | Performed by: INTERNAL MEDICINE

## 2018-08-01 PROCEDURE — G0463 HOSPITAL OUTPT CLINIC VISIT: HCPCS | Performed by: INTERNAL MEDICINE

## 2018-08-01 PROCEDURE — 99214 OFFICE O/P EST MOD 30 MIN: CPT | Performed by: INTERNAL MEDICINE

## 2018-08-01 PROCEDURE — 73630 X-RAY EXAM OF FOOT: CPT | Performed by: INTERNAL MEDICINE

## 2018-08-01 NOTE — PROGRESS NOTES
HPI:   1. Left Ankle Pain    Fell 2 weeks ago over dog and twisted her foot. Has been getting some swelling in ankle since that time . Toes have become black and blue the last week or so.  Had to hop around for 2 days and then could have some weight bearing ----------  HDL Cholesterol (mg/dL)   Date Value   06/18/2017 72   10/09/2016 57   ----------  HDL CHOLESTEROL (P) (mg/dL)   Date Value   09/26/2015 54   05/20/2014 62 (H)   08/22/2012 56   ----------  LDL Cholesterol (mg/dL)   Date Value   06/18/2017 51 Comment: 1 glass wine socially    Exercise:  twice per week.   Diet: watches sugar closely     REVIEW OF SYSTEMS:   GENERAL HEALTH: feels well otherwise  SKIN: denies any unusual skin lesions or rashes  RESPIRATORY: denies shortness of breath with e blood sugars regularly. Take diabetic medicines as prescribed. Bring in list of sugars and time they were done to next visit. Zuly Fair is a 69year old female who presents for a recheck of her diabetes.  Diabetic control is much better  Have added alexandra

## 2018-08-06 ENCOUNTER — HOSPITAL ENCOUNTER (OUTPATIENT)
Dept: ULTRASOUND IMAGING | Facility: HOSPITAL | Age: 78
Discharge: HOME | End: 2018-08-06
Attending: ORTHOPAEDIC SURGERY | Admitting: ORTHOPAEDIC SURGERY
Payer: MEDICARE

## 2018-08-06 ENCOUNTER — OFFICE VISIT (OUTPATIENT)
Dept: ORTHOPEDICS CLINIC | Facility: CLINIC | Age: 78
End: 2018-08-06
Payer: MEDICARE

## 2018-08-06 DIAGNOSIS — M79.662 PAIN AND SWELLING OF LEFT LOWER LEG: ICD-10-CM

## 2018-08-06 DIAGNOSIS — S82.892A CLOSED FRACTURE OF LEFT ANKLE, INITIAL ENCOUNTER: ICD-10-CM

## 2018-08-06 DIAGNOSIS — S82.892A CLOSED FRACTURE OF LEFT ANKLE, INITIAL ENCOUNTER: Primary | ICD-10-CM

## 2018-08-06 DIAGNOSIS — M79.89 PAIN AND SWELLING OF LEFT LOWER LEG: ICD-10-CM

## 2018-08-06 PROCEDURE — 99203 OFFICE O/P NEW LOW 30 MIN: CPT | Performed by: ORTHOPAEDIC SURGERY

## 2018-08-06 PROCEDURE — G0463 HOSPITAL OUTPT CLINIC VISIT: HCPCS | Performed by: ORTHOPAEDIC SURGERY

## 2018-08-06 PROCEDURE — L4386 NON-PNEUM WALK BOOT PRE CST: HCPCS | Performed by: ORTHOPAEDIC SURGERY

## 2018-08-06 PROCEDURE — 93970 EXTREMITY STUDY: CPT | Performed by: ORTHOPAEDIC SURGERY

## 2018-08-06 NOTE — H&P
NURSING INTAKE COMMENTS: Patient presents with:  Consult: C/o sharp pain in the left foot for about a week. Stts there's pain when she puts pressure on it. Stts she injured her foot when she tripped over a dog and twisted her ankle.   Xrays of both left a Rev 3/07  Activity: Remember to:   Turn, cough and deep breathe every 2-3 hrs while awake to expand your lungs.   Use a breathing device every 1-2 hrs while awake to expand your lungs.   Wear your PRERNA hose daily until you are moving about freely.   Use a pillow to splint your incisions while coughing.   Point your toes to the ceiling and push your heels downward frequently in order to prevent blood clots.   Keep your chest elevated on 2-3 pillows for 2 weeks or until all the swelling has diminished. Do not lie  on either side for 6 weeks.   You may begin to do leg and lower body exercises but nothing using the upper torso for 4-6 weeks  after surgery. Check with your doctor before resuming any exercise program.   Do not engage in sexual activity for at least 2 weeks after surgery.   Avoid bending down and picking up heavy objects. Do not  anything over 10 lbs for the first 4  weeks. At 4-6 weeks you may increase weight up to 25 lbs. After 6 weeks you are free to lift anything.   Do not operate a motor vehicle until you can handle the steering wheel without causing any discomfort  or within 24 hrs of taking prescribed pain medication.  Dressings:   Keep your dressings dry. You may shower in 48 hours at which time you may change your dressings.   Do not remove your steristrips. They will fall off in 5-7 days or be removed by your doctor on your  postoperative visit.   Wear the surgical bra at all times for the first 2-3 weeks. You may remove it to shower or to clean it.  Check with your doctor to see when you can switch to a good support bra. Additional bras may be  purchased from the department at an additional cost.  If you have any drains, you will need to:   Strip the drain tubings   Empty the drain bulbs   Compress the bulbs, every 3-4 hrs unless they are more than ½ full  The nurse should demonstrate this before your discharge from the surgery unit. The drains will be removed  when they  at age 79)   • Infectious Disease Mother      sepsis (cause of death at age 76)         Social History  Social History   Marital status:   Spouse name: N/A    Years of education: N/A  Number of children: N/A     Occupational History  None on file put out less than 25 cc in 24 hrs. You should record the amount of drainage on a sheet of paper for  each drain for every 24 hour period. Call those totals to your doctors nurse daily.  You may see little pimple-like blisters along your incision site. They may appear as tiny abscesses, wounds that  wont heal, or as pimples. Theyre sutures that have not completely dissolved.  Home Care Instructions  Breast Implant/Explant  Dr Sanchez  Page 1 of 2  Rev 3/07  Medications:  Take your prescribed medications as ordered. Take your antibiotics until theyre all gone. You will need to be  on antibiotics as long as you have a drain tube in. Stay away from aspirin, ibuprofen, and Vitamin E products  for 2 weeks after your surgery. You may take Tylenol if experiencing pain, if it is not an ingredient in your pain  medication. Pain medication, anesthesia, and a lack of exercise can cause you to have bowel problems. If you  have not had a bowel movement in 3-5 days after surgery call the office and an over the counter remedy will  be recommended.  Diet:  At home, continue with liquids, and if there is no nausea, you may eat a soft bland diet. Gradually resume your  normal diet. Avoid foods, such as Mexican, Chinese, seafood, or salty soups, for 1 week after surgery.  Other:   Do not smoke or be with smokers for 2 weeks after surgery.   Avoid direct sunlight to your incision. Apply a good sun block frequently, (a spf 20 or greater) to your  incision site if you plan to expose them to sunlight.   Do not be alarmed if your breasts appear too large or too high. The swelling and shape will change the  further away from surgery you get.  Call the office immediately if you have:   A temperature of 101 or greater.   Redness thats beginning to spread away from the incision site.   Any unusually painful swelling.   Any active bleeding saturating more than a 4x4 gauze.   Any purulent drainage coming from the incision site.    Pain that is not relieved by your pain medication.   If you notice any asymmetry or flatness of your chest. (This may mean your implant is leaking or  has a hole in it.)  RETURN APPOINTMENT:________________________________________________________________  FOR EMERGENCIES:  If any unusual problems or difficulties occur, contact Dr. Sanchez or the resident at [phone] (page  ) or at the Clinic office, [phone].  Home Care Instructions  Breast Implant/Explant  Dr Sanchez      Rev 11/09  CARE OF THE DRAIN:   Wash hands before starting!   Change the bandages daily, keep drain site clean and dry.   Check the drain every 4 hours for the first 24 hours and empty the drain when half full of liquid. After  the first day, empty when half full or every 8 to 12 hours. DO NOT LET THE DRAIN FILL MORE  THAN HALF WAY.   After the drain is emptied, the bulb needs to be squeezed between the palm and the fingers until they  meet and then recap the plug. (See picture.) This is to keep the suction continuous.  HOW TO EMPTY THE DRAIN:   Wash hands before starting!   Remove plug from top and pour fluid into measuring cup.   DO NOT TOUCH OPEN PORT.   Squeeze bulb tightly while plug is still off.   While squeezing bulb replace plug to seal the bulb.   Measure the amount of fluid that was removed from the  bulb and record the amount of the fluid for the doctor  with the date and time it was collected.   Flush the fluid down toilet.  HOW TO PREVENT PROBLEMS:   Always keep the bulb lower than the wound. This will stop the fluid from going back into your body.   Do not pull on the tubing. This can loosen the stitches holding the drain to your skin, causing the drain  to fall out.  WHEN TO CALL THE DOCTOR OR SEEK IMMEDIATE CARE IF:   The fluid removed by the HILARY drain is cloudy, yellow or foul-smelling OR suddenly stops draining into  the bulb of your HILARY drain.   Swelling or redness where the drain enters your  can be treated nonoperatively, leg swelling that is concerning for DVT    Plan: Patient will be placed in a cam boot. She will go for a stat ultrasound to rule out a DVT.   She is weightbearing as tolerated with the cam boot and can wear the cam boot as ne skin.   Feel more pain in the drain area.   See holes or cracks in the tubing or bulb of the drain, or if the drain leaks, the drain stitches come loose  or break off, OR the HILARY DRAIN COMES OUT.   The HILARY drain starts filling up very quickly with bright red blood.   You develop fever above 101ºF (38.4ºC).   Your bandages are soaked with blood.  FOR EMERGENCIES:  If any unusual problems or difficulties occur, contact  __________________ at [phone] (page  ) or at the Clinic office, [phone]  Home Care Instructions  HILARY Drain

## 2018-08-23 ENCOUNTER — OFFICE VISIT (OUTPATIENT)
Dept: ORTHOPEDICS CLINIC | Facility: CLINIC | Age: 78
End: 2018-08-23
Payer: MEDICARE

## 2018-08-23 ENCOUNTER — HOSPITAL ENCOUNTER (OUTPATIENT)
Dept: GENERAL RADIOLOGY | Facility: HOSPITAL | Age: 78
Discharge: HOME | End: 2018-08-23
Attending: ORTHOPAEDIC SURGERY | Admitting: ORTHOPAEDIC SURGERY
Payer: MEDICARE

## 2018-08-23 DIAGNOSIS — Z47.89 ORTHOPEDIC AFTERCARE: ICD-10-CM

## 2018-08-23 DIAGNOSIS — Z47.89 ORTHOPEDIC AFTERCARE: Primary | ICD-10-CM

## 2018-08-23 DIAGNOSIS — S82.892A CLOSED FRACTURE OF LEFT ANKLE, INITIAL ENCOUNTER: ICD-10-CM

## 2018-08-23 PROCEDURE — G0463 HOSPITAL OUTPT CLINIC VISIT: HCPCS | Performed by: ORTHOPAEDIC SURGERY

## 2018-08-23 PROCEDURE — 73610 X-RAY EXAM OF ANKLE: CPT | Performed by: ORTHOPAEDIC SURGERY

## 2018-08-23 PROCEDURE — 99213 OFFICE O/P EST LOW 20 MIN: CPT | Performed by: ORTHOPAEDIC SURGERY

## 2018-08-23 NOTE — PROGRESS NOTES
Patient presents for follow-up. No major issues but continued mild pain and swelling. She did have an ultrasound to rule out a DVT which is negative. On examination, she can walk comfortably with the boot. She has swelling about the ankle.   She is ne

## 2018-09-04 ENCOUNTER — OFFICE VISIT (OUTPATIENT)
Dept: PAIN CLINIC | Facility: HOSPITAL | Age: 78
End: 2018-09-04
Attending: NURSE PRACTITIONER
Payer: MEDICARE

## 2018-09-04 DIAGNOSIS — M54.50 LUMBAR PAIN: Primary | ICD-10-CM

## 2018-09-04 PROCEDURE — 99211 OFF/OP EST MAY X REQ PHY/QHP: CPT

## 2018-09-04 NOTE — CHRONIC PAIN
Follow-up Note  HISTORY OF PRESENT ILLNESS:  Salvatore Jay is a 78year old old female, originally referred to the pain clinic by Dr Jhonathan Cormier, with history of Lumbar pain  (primary encounter diagnosis); chronic low back pain returns to the clinic for fo pain.  Numbness/tingling: Denies  Weakness: Denies  Weight Loss: Negative   Fever: Negative   Cardiovascular:  No current chest pain or palpitations   Respiratory:  No current shortness of breath   Gastrointestinal:  No active ulcer  Genitourinary:  Mike Robert Cigarettes    Quit date: 1/1/1985    Smokeless tobacco: Never Used    Alcohol use Yes    Comment: 1 glass wine socially    Drug use: Unknown     Other Topics Concern    Caffeine Concern Yes    Comment: soda/tablets - 2 cups daily     Social History Narrati treatment options), face to face time, time spent reviewing data, obtaining patient information and discussing the care with the patients health care providers.

## 2018-09-04 NOTE — PROGRESS NOTES
Patient presents to [facility] ambulatory with cane for follow up right transforaminal epidural injection done 7-31. She reports 70% pain relief. She has no pain today but intermittently has sharp pain in her groin. She is very happy with the results.   MELON Mol

## 2018-09-06 ENCOUNTER — TELEPHONE (OUTPATIENT)
Dept: PULMONOLOGY | Facility: CLINIC | Age: 78
End: 2018-09-06

## 2018-09-06 NOTE — TELEPHONE ENCOUNTER
5/10/18 OV Note left in outgoing faxes to be sent to [facility] Gloria/VICTORINO as requested.

## 2018-09-11 ENCOUNTER — TELEPHONE (OUTPATIENT)
Dept: PULMONOLOGY | Facility: CLINIC | Age: 78
End: 2018-09-11

## 2018-09-11 NOTE — TELEPHONE ENCOUNTER
Isabelle/VICTORINO states that office notes don't state any reason that pt still needs to be on oxygen. Please call.

## 2018-09-12 NOTE — TELEPHONE ENCOUNTER
Freedom Koroma states pt needs O2 re-certification with ambulatory oximetry. Freedom Koroma informed pt has upcoming appt 11/8/18. Freedom Koroma informed this message will be placed in the upcoming appt folder. This message left in the upcoming appt folder.

## 2018-09-26 RX ORDER — PRAVASTATIN SODIUM 20 MG
TABLET ORAL
Qty: 90 TABLET | Refills: 0 | Status: SHIPPED | OUTPATIENT
Start: 2018-09-26 | End: 2018-10-03

## 2018-09-26 RX ORDER — DILTIAZEM HYDROCHLORIDE 240 MG/1
CAPSULE, COATED, EXTENDED RELEASE ORAL
Qty: 90 CAPSULE | Refills: 0 | Status: SHIPPED | OUTPATIENT
Start: 2018-09-26 | End: 2018-10-03

## 2018-09-27 ENCOUNTER — OFFICE VISIT (OUTPATIENT)
Dept: ORTHOPEDICS CLINIC | Facility: CLINIC | Age: 78
End: 2018-09-27
Payer: MEDICARE

## 2018-09-27 ENCOUNTER — HOSPITAL ENCOUNTER (OUTPATIENT)
Dept: GENERAL RADIOLOGY | Facility: HOSPITAL | Age: 78
Discharge: HOME | End: 2018-09-27
Attending: ORTHOPAEDIC SURGERY | Admitting: ORTHOPAEDIC SURGERY
Payer: MEDICARE

## 2018-09-27 DIAGNOSIS — S82.892A CLOSED FRACTURE OF LEFT ANKLE, INITIAL ENCOUNTER: Primary | ICD-10-CM

## 2018-09-27 DIAGNOSIS — Z47.89 ORTHOPEDIC AFTERCARE: ICD-10-CM

## 2018-09-27 PROCEDURE — 73610 X-RAY EXAM OF ANKLE: CPT | Performed by: ORTHOPAEDIC SURGERY

## 2018-09-27 PROCEDURE — 99213 OFFICE O/P EST LOW 20 MIN: CPT | Performed by: ORTHOPAEDIC SURGERY

## 2018-09-27 PROCEDURE — G0463 HOSPITAL OUTPT CLINIC VISIT: HCPCS | Performed by: ORTHOPAEDIC SURGERY

## 2018-09-27 NOTE — PROGRESS NOTES
NURSING INTAKE COMMENTS: Patient presents with: Follow - Up: Follow up on her left ankle pain. Stts that it has been better. Stts once in a while in bed she notices pain when she turns her leg. Does not wear the cam boot at night.   Pain level 6      Pa Comment:Other reaction(s):  Anaphylaxis  Penicillins                 Comment:Other reaction(s): PENICILLINS    Family History   Problem Relation Age of Onset   • Heart Disease Father         CAD (cause of death at age 77)   • Infectious Disease Mother pain.    Imaging: Satisfactory with no interval change in alignment    Clifford Ponce was seen today for follow - up.     Diagnoses and all orders for this visit:    Closed fracture of left ankle, initial encounter    Orthopedic aftercare  -     XR ANKLE (MIN 3 VI

## 2018-10-03 ENCOUNTER — OFFICE VISIT (OUTPATIENT)
Dept: INTERNAL MEDICINE CLINIC | Facility: CLINIC | Age: 78
End: 2018-10-03
Payer: MEDICARE

## 2018-10-03 VITALS
HEIGHT: 62 IN | BODY MASS INDEX: 24.48 KG/M2 | HEART RATE: 82 BPM | RESPIRATION RATE: 16 BRPM | DIASTOLIC BLOOD PRESSURE: 82 MMHG | WEIGHT: 133 LBS | SYSTOLIC BLOOD PRESSURE: 138 MMHG

## 2018-10-03 DIAGNOSIS — I10 ESSENTIAL HYPERTENSION WITH GOAL BLOOD PRESSURE LESS THAN 130/85: ICD-10-CM

## 2018-10-03 DIAGNOSIS — E11.9 TYPE 2 DIABETES MELLITUS WITHOUT COMPLICATION, WITHOUT LONG-TERM CURRENT USE OF INSULIN (HCC): ICD-10-CM

## 2018-10-03 DIAGNOSIS — S82.892G CLOSED FRACTURE OF LEFT ANKLE WITH DELAYED HEALING: Primary | ICD-10-CM

## 2018-10-03 PROCEDURE — G0463 HOSPITAL OUTPT CLINIC VISIT: HCPCS | Performed by: INTERNAL MEDICINE

## 2018-10-03 PROCEDURE — 99214 OFFICE O/P EST MOD 30 MIN: CPT | Performed by: INTERNAL MEDICINE

## 2018-10-03 RX ORDER — LOSARTAN POTASSIUM 50 MG/1
50 TABLET ORAL DAILY
Qty: 90 TABLET | Refills: 3 | Status: SHIPPED | OUTPATIENT
Start: 2018-10-03 | End: 2019-06-27

## 2018-10-03 RX ORDER — DILTIAZEM HYDROCHLORIDE 240 MG/1
CAPSULE, COATED, EXTENDED RELEASE ORAL
Qty: 90 CAPSULE | Refills: 3 | Status: SHIPPED | OUTPATIENT
Start: 2018-10-03 | End: 2019-06-27

## 2018-10-03 RX ORDER — PRAVASTATIN SODIUM 20 MG
TABLET ORAL
Qty: 90 TABLET | Refills: 3 | Status: SHIPPED | OUTPATIENT
Start: 2018-10-03 | End: 2019-06-27

## 2018-10-03 NOTE — PROGRESS NOTES
HPI:   1. Closed fracture of left ankle with delayed healing    Fell 6 weeks ago over dog and twisted her foot. Has been getting some swelling in ankle since that time . Toes have become black and blue the last week or so.  Had to hop around for 2 days and (mg/dL)   Date Value   06/18/2017 51   10/09/2016 73   09/26/2015 80   05/20/2014 66   08/22/2012 86     AST (U/L)   Date Value   04/17/2018 18   10/09/2016 20   09/26/2015 17   05/20/2014 23   08/22/2012 21     ALT (U/L)   Date Value   04/17/2018 29   10/ Diet: watches sugar closely     REVIEW OF SYSTEMS:     GENERAL HEALTH: feels well otherwise  SKIN: denies any unusual skin lesions or rashes  RESPIRATORY: denies shortness of breath with exertion  CARDIOVASCULAR: denies chest pain on exertion  GI: denies a take anti-hypertensive medicines exactly as prescribed and to follow a low salt diet as discussed. Regular exercise at least 3 times weekly will help to curb one's appetite, control weight and lead to better blood pressure control.  Record blood pressures a

## 2018-11-12 ENCOUNTER — NURSE TRIAGE (OUTPATIENT)
Dept: OTHER | Age: 78
End: 2018-11-12

## 2018-11-12 RX ORDER — AZITHROMYCIN 250 MG/1
TABLET, FILM COATED ORAL
Qty: 6 TABLET | Refills: 0 | Status: SHIPPED | OUTPATIENT
Start: 2018-11-12 | End: 2019-01-10

## 2018-11-12 NOTE — TELEPHONE ENCOUNTER
Action Requested: Summary for Provider     []  Critical Lab, Recommendations Needed  [] Need Additional Advice  []   FYI    []   Need Orders  [x] Need Medications Sent to Pharmacy  []  Other     SUMMARY: requesting Derrick Liang to pharmacy \"ASAP\"  Onset < 24

## 2018-11-12 NOTE — TELEPHONE ENCOUNTER
Spoke with patient, advised Dr Norberto Walker's note and verbalzied understanding, pharmacy verified, with FU OV on 11/15/18. Note      Ok to send ankur joseph to pharmacy.  See me if no better

## 2018-11-15 ENCOUNTER — OFFICE VISIT (OUTPATIENT)
Dept: INTERNAL MEDICINE CLINIC | Facility: CLINIC | Age: 78
End: 2018-11-15
Payer: MEDICARE

## 2018-11-15 VITALS
HEIGHT: 62 IN | BODY MASS INDEX: 25.58 KG/M2 | WEIGHT: 139 LBS | RESPIRATION RATE: 16 BRPM | HEART RATE: 70 BPM | TEMPERATURE: 98 F | SYSTOLIC BLOOD PRESSURE: 167 MMHG | DIASTOLIC BLOOD PRESSURE: 74 MMHG

## 2018-11-15 DIAGNOSIS — E11.9 TYPE 2 DIABETES MELLITUS WITHOUT COMPLICATION, WITHOUT LONG-TERM CURRENT USE OF INSULIN (HCC): ICD-10-CM

## 2018-11-15 DIAGNOSIS — J44.1 COPD EXACERBATION (HCC): ICD-10-CM

## 2018-11-15 DIAGNOSIS — J01.00 ACUTE MAXILLARY SINUSITIS, RECURRENCE NOT SPECIFIED: Primary | ICD-10-CM

## 2018-11-15 DIAGNOSIS — I10 ESSENTIAL HYPERTENSION WITH GOAL BLOOD PRESSURE LESS THAN 130/85: ICD-10-CM

## 2018-11-15 PROCEDURE — 99214 OFFICE O/P EST MOD 30 MIN: CPT | Performed by: INTERNAL MEDICINE

## 2018-11-15 PROCEDURE — G0463 HOSPITAL OUTPT CLINIC VISIT: HCPCS | Performed by: INTERNAL MEDICINE

## 2018-11-15 NOTE — PROGRESS NOTES
HPI:   1. Acute maxillary sinusitis, recurrence not specified    Has been having face pain and issues with congestion and green phlegm. Has not been running a fever at all lately.      2. Chronic obstructive pulmonary disease with acute lower respiratory in 05/20/2014 62 (H)   08/22/2012 56     LDL Cholesterol (mg/dL)   Date Value   06/18/2017 51   10/09/2016 73   09/26/2015 80   05/20/2014 66   08/22/2012 86     AST (U/L)   Date Value   04/17/2018 18   10/09/2016 20   09/26/2015 17   05/20/2014 23   08/22/ 33.8      Smokeless tobacco: Never Used    Alcohol use: Yes      Comment: 1 glass wine socially    Drug use: Not on file    Exercise:  twice per week.   Diet: watches sugar closely     REVIEW OF SYSTEMS:   GENERAL HEALTH: feels well otherwise  SKIN: denies time they were done to next visit. James Daniel is a 69year old female who presents for a recheck of her diabetes. Diabetic control is much better  Have added amaryl 2 mg daily.   Recommendations are: continue present meds, lose wgt with carbohydrate contr

## 2019-01-09 RX ORDER — DILTIAZEM HYDROCHLORIDE 240 MG/1
CAPSULE, COATED, EXTENDED RELEASE ORAL
Qty: 90 CAPSULE | Refills: 0 | Status: SHIPPED | OUTPATIENT
Start: 2019-01-09 | End: 2019-01-10

## 2019-01-09 NOTE — TELEPHONE ENCOUNTER
Refill passed per [facility] protocol.   Hypertensive Medications  Protocol Criteria:  · Appointment scheduled in the past 6 months or in the next 3 months  · BMP or CMP in the past 12 months  · Creatinine result < 2  Recent Outpatient Visits

## 2019-01-10 ENCOUNTER — OFFICE VISIT (OUTPATIENT)
Dept: INTERNAL MEDICINE CLINIC | Facility: CLINIC | Age: 79
End: 2019-01-10
Payer: MEDICARE

## 2019-01-10 VITALS
HEIGHT: 62 IN | HEART RATE: 76 BPM | DIASTOLIC BLOOD PRESSURE: 62 MMHG | WEIGHT: 136 LBS | SYSTOLIC BLOOD PRESSURE: 138 MMHG | RESPIRATION RATE: 16 BRPM | BODY MASS INDEX: 25.03 KG/M2

## 2019-01-10 DIAGNOSIS — E11.9 TYPE 2 DIABETES MELLITUS WITHOUT COMPLICATION, WITHOUT LONG-TERM CURRENT USE OF INSULIN (HCC): ICD-10-CM

## 2019-01-10 DIAGNOSIS — I10 ESSENTIAL HYPERTENSION WITH GOAL BLOOD PRESSURE LESS THAN 130/85: ICD-10-CM

## 2019-01-10 DIAGNOSIS — Z23 NEED FOR VACCINATION: ICD-10-CM

## 2019-01-10 DIAGNOSIS — J43.9 PULMONARY EMPHYSEMA, UNSPECIFIED EMPHYSEMA TYPE (HCC): Primary | ICD-10-CM

## 2019-01-10 PROCEDURE — 99214 OFFICE O/P EST MOD 30 MIN: CPT | Performed by: INTERNAL MEDICINE

## 2019-01-10 PROCEDURE — G0463 HOSPITAL OUTPT CLINIC VISIT: HCPCS | Performed by: INTERNAL MEDICINE

## 2019-01-10 PROCEDURE — 90732 PPSV23 VACC 2 YRS+ SUBQ/IM: CPT | Performed by: INTERNAL MEDICINE

## 2019-01-10 PROCEDURE — G0009 ADMIN PNEUMOCOCCAL VACCINE: HCPCS | Performed by: INTERNAL MEDICINE

## 2019-01-10 NOTE — PROGRESS NOTES
HPI: 1 . Chronic obstructive pulmonary disease with acute lower respiratory infection ([facility] [address]    Has been having less breathing issues. Was hospitalized with pneumonia. Feeling much better. Appetite good. Wears oxygen overnite.  Nose gets quite dry at [place] Date Value   04/17/2018 18   10/09/2016 20   09/26/2015 17   05/20/2014 23   08/22/2012 21     ALT (U/L)   Date Value   04/17/2018 29   10/09/2016 13 (L)   09/26/2015 15   05/20/2014 16   08/22/2012 17      No results found for: MICROALBCREA      Current skin lesions or rashes  RESPIRATORY: denies shortness of breath with exertion  CARDIOVASCULAR: denies chest pain on exertion  GI: denies abdominal pain and denies heartburn  NEURO: denies headaches    EXAM:   /62   Pulse 76   Resp 16   Ht 5' 2\" (1.5 3 times weekly will help to curb one's appetite, control weight and lead to better blood pressure control. Record blood pressures at home and bring readings to your next office visit to review.     4. Need for vaccination    Nurse to give Nanda Vidal

## 2019-02-07 ENCOUNTER — MED REC SCAN ONLY (OUTPATIENT)
Dept: INTERNAL MEDICINE CLINIC | Facility: CLINIC | Age: 79
End: 2019-02-07

## 2019-03-25 RX ORDER — FLUTICASONE FUROATE AND VILANTEROL TRIFENATATE 100; 25 UG/1; UG/1
POWDER RESPIRATORY (INHALATION)
Qty: 1 EACH | Refills: 3 | Status: SHIPPED | OUTPATIENT
Start: 2019-03-25 | End: 2019-06-27

## 2019-06-27 ENCOUNTER — OFFICE VISIT (OUTPATIENT)
Dept: INTERNAL MEDICINE CLINIC | Facility: CLINIC | Age: 79
End: 2019-06-27
Payer: MEDICARE

## 2019-06-27 VITALS
DIASTOLIC BLOOD PRESSURE: 84 MMHG | BODY MASS INDEX: 25.58 KG/M2 | RESPIRATION RATE: 16 BRPM | SYSTOLIC BLOOD PRESSURE: 178 MMHG | WEIGHT: 139 LBS | HEART RATE: 100 BPM | HEIGHT: 62 IN

## 2019-06-27 DIAGNOSIS — E11.9 TYPE 2 DIABETES MELLITUS WITHOUT COMPLICATION, WITHOUT LONG-TERM CURRENT USE OF INSULIN (HCC): ICD-10-CM

## 2019-06-27 DIAGNOSIS — J43.2 CENTRILOBULAR EMPHYSEMA (HCC): Primary | ICD-10-CM

## 2019-06-27 DIAGNOSIS — I10 ESSENTIAL HYPERTENSION WITH GOAL BLOOD PRESSURE LESS THAN 130/85: ICD-10-CM

## 2019-06-27 PROCEDURE — G0463 HOSPITAL OUTPT CLINIC VISIT: HCPCS | Performed by: INTERNAL MEDICINE

## 2019-06-27 PROCEDURE — 99214 OFFICE O/P EST MOD 30 MIN: CPT | Performed by: INTERNAL MEDICINE

## 2019-06-27 RX ORDER — LOSARTAN POTASSIUM 50 MG/1
50 TABLET ORAL DAILY
Qty: 90 TABLET | Refills: 3 | Status: SHIPPED | OUTPATIENT
Start: 2019-06-27 | End: 2020-08-25

## 2019-06-27 RX ORDER — DILTIAZEM HYDROCHLORIDE 240 MG/1
CAPSULE, COATED, EXTENDED RELEASE ORAL
Qty: 90 CAPSULE | Refills: 3 | Status: SHIPPED | OUTPATIENT
Start: 2019-06-27 | End: 2021-03-03

## 2019-06-27 RX ORDER — PRAVASTATIN SODIUM 20 MG
TABLET ORAL
Qty: 90 TABLET | Refills: 3 | Status: SHIPPED | OUTPATIENT
Start: 2019-06-27 | End: 2020-11-03

## 2019-07-05 NOTE — PROGRESS NOTES
1. Centrilobular emphysema ([address]    Has been having less breathing issues. Was hospitalized with pneumonia. Feeling much better. Appetite good. Wears oxygen overnite. Nose gets quite dry at times.      2. Type 2 diabetes mellitus without complication, wit (U/L)   Date Value   04/17/2018 18   10/09/2016 20   09/26/2015 17   05/20/2014 23   08/22/2012 21     ALT (U/L)   Date Value   04/17/2018 29   10/09/2016 13 (L)   09/26/2015 15   05/20/2014 16   08/22/2012 17      No results found for: MICROALBCREA      C otherwise  SKIN: denies any unusual skin lesions or rashes  RESPIRATORY: denies shortness of breath with exertion  CARDIOVASCULAR: denies chest pain on exertion  GI: denies abdominal pain and denies heartburn  NEURO: denies headaches    EXAM:   BP (!) 178/ pressure less than 130/85    Patient instructed to take anti-hypertensive medicines exactly as prescribed and to follow a low salt diet as discussed.  Regular exercise at least 3 times weekly will help to curb one's appetite, control weight and lead to bett

## 2019-10-22 RX ORDER — PRAVASTATIN SODIUM 20 MG
TABLET ORAL
Qty: 90 TABLET | Refills: 0 | OUTPATIENT
Start: 2019-10-22

## 2019-10-23 NOTE — TELEPHONE ENCOUNTER
Duplicate request, previously addressed.   [facility], [address] [place] [address], [phone], [phone]   Outpatient Medication Detail      Disp Refills Start End    Pravastatin Sodium 20 MG Oral Tab 90 tablet 3 6/27/2019     Sig: TAKE 1 TABLET(20 MG) BY MOUTH EVERY NIGHT    Sent to pharmacy as: Pravastatin Sodium 20 MG Oral Tablet    E-Prescribing Status: Receipt confirmed by pharmacy (6/27/2019 12:23 PM CDT)

## 2019-11-21 ENCOUNTER — OFFICE VISIT (OUTPATIENT)
Dept: INTERNAL MEDICINE CLINIC | Facility: CLINIC | Age: 79
End: 2019-11-21
Payer: MEDICARE

## 2019-11-21 VITALS
HEIGHT: 62 IN | RESPIRATION RATE: 16 BRPM | SYSTOLIC BLOOD PRESSURE: 137 MMHG | BODY MASS INDEX: 25.58 KG/M2 | HEART RATE: 76 BPM | DIASTOLIC BLOOD PRESSURE: 80 MMHG | WEIGHT: 139 LBS

## 2019-11-21 DIAGNOSIS — E11.9 TYPE 2 DIABETES MELLITUS WITHOUT COMPLICATION, WITHOUT LONG-TERM CURRENT USE OF INSULIN (HCC): ICD-10-CM

## 2019-11-21 DIAGNOSIS — J43.9 PULMONARY EMPHYSEMA, UNSPECIFIED EMPHYSEMA TYPE (HCC): Primary | ICD-10-CM

## 2019-11-21 DIAGNOSIS — I10 ESSENTIAL HYPERTENSION WITH GOAL BLOOD PRESSURE LESS THAN 130/85: ICD-10-CM

## 2019-11-21 DIAGNOSIS — B35.1 FUNGAL INFECTION OF NAIL: ICD-10-CM

## 2019-11-21 PROCEDURE — G0463 HOSPITAL OUTPT CLINIC VISIT: HCPCS | Performed by: INTERNAL MEDICINE

## 2019-11-21 PROCEDURE — 99214 OFFICE O/P EST MOD 30 MIN: CPT | Performed by: INTERNAL MEDICINE

## 2019-11-21 RX ORDER — CLOTRIMAZOLE 1 %
1 CREAM (GRAM) TOPICAL 2 TIMES DAILY
Qty: 15 G | Refills: 2 | Status: SHIPPED | OUTPATIENT
Start: 2019-11-21

## 2019-11-21 NOTE — PROGRESS NOTES
1. Chronic obstructive pulmonary disease with acute lower respiratory infection ([facility] [address]    Has been having less breathing issues. Was hospitalized with pneumonia. Feeling much better. Appetite good. Wears oxygen overnite. Nose gets quite dry at times. 10/09/2016 73   09/26/2015 80   05/20/2014 66   08/22/2012 86     AST (U/L)   Date Value   04/17/2018 18   10/09/2016 20   09/26/2015 17   05/20/2014 23   08/22/2012 21     ALT (U/L)   Date Value   04/17/2018 29   10/09/2016 13 (L)   09/26/2015 15   05/2 watches sugar closely     REVIEW OF SYSTEMS:   GENERAL HEALTH: feels well otherwise  SKIN: denies any unusual skin lesions or rashes  RESPIRATORY: denies shortness of breath with exertion  CARDIOVASCULAR: denies chest pain on exertion  GI: denies abdominal this month, check feet daily. 3. Essential hypertension with goal blood pressure less than 130/85    Patient instructed to take anti-hypertensive medicines exactly as prescribed and to follow a low salt diet as discussed.  Regular exercise at least 3 [place]

## 2020-02-10 ENCOUNTER — MED REC SCAN ONLY (OUTPATIENT)
Dept: INTERNAL MEDICINE CLINIC | Facility: CLINIC | Age: 80
End: 2020-02-10

## 2020-02-24 ENCOUNTER — OFFICE VISIT (OUTPATIENT)
Dept: INTERNAL MEDICINE CLINIC | Facility: CLINIC | Age: 80
End: 2020-02-24
Payer: MEDICARE

## 2020-02-24 VITALS
SYSTOLIC BLOOD PRESSURE: 173 MMHG | BODY MASS INDEX: 25.03 KG/M2 | HEIGHT: 62 IN | WEIGHT: 136 LBS | RESPIRATION RATE: 16 BRPM | DIASTOLIC BLOOD PRESSURE: 76 MMHG | HEART RATE: 76 BPM

## 2020-02-24 DIAGNOSIS — I10 ESSENTIAL HYPERTENSION WITH GOAL BLOOD PRESSURE LESS THAN 130/85: ICD-10-CM

## 2020-02-24 DIAGNOSIS — J43.2 CENTRILOBULAR EMPHYSEMA (HCC): Primary | ICD-10-CM

## 2020-02-24 DIAGNOSIS — E11.65 TYPE 2 DIABETES MELLITUS WITH HYPERGLYCEMIA, WITHOUT LONG-TERM CURRENT USE OF INSULIN (HCC): ICD-10-CM

## 2020-02-24 PROBLEM — I82.409 ACUTE EMBOLISM AND THROMBOSIS OF DEEP VEIN OF LOWER EXTREMITY (HCC): Status: ACTIVE | Noted: 2020-02-24

## 2020-02-24 PROBLEM — J96.12 CHRONIC RESPIRATORY FAILURE WITH HYPERCAPNIA (HCC): Status: ACTIVE | Noted: 2020-02-24

## 2020-02-24 PROCEDURE — G0463 HOSPITAL OUTPT CLINIC VISIT: HCPCS | Performed by: INTERNAL MEDICINE

## 2020-02-24 PROCEDURE — 99214 OFFICE O/P EST MOD 30 MIN: CPT | Performed by: INTERNAL MEDICINE

## 2020-02-24 NOTE — PROGRESS NOTES
1. Chronic obstructive pulmonary disease with acute lower respiratory infection ([facility])    Has been having less breathing issues. Was hospitalized with pneumonia. Feeling much better. Appetite good. Wears oxygen overnite. Nose gets quite dry at times. 04/17/2018 18   10/09/2016 20   09/26/2015 17   05/20/2014 23   08/22/2012 21     ALT (U/L)   Date Value   04/17/2018 29   10/09/2016 13 (L)   09/26/2015 15   05/20/2014 16   08/22/2012 17      No results found for: [address] [place] [address] denies any unusual skin lesions or rashes  RESPIRATORY: denies shortness of breath with exertion  CARDIOVASCULAR: denies chest pain on exertion  GI: denies abdominal pain and denies heartburn  NEURO: denies headaches    EXAM:   BP (!) 132/84 (BP Location: than 130/85    Patient instructed to take anti-hypertensive medicines exactly as prescribed and to follow a low salt diet as discussed.  Regular exercise at least 3 times weekly will help to curb one's appetite, control weight and lead to better blood press

## 2020-06-01 ENCOUNTER — OFFICE VISIT (OUTPATIENT)
Dept: INTERNAL MEDICINE CLINIC | Facility: CLINIC | Age: 80
End: 2020-06-01
Payer: MEDICARE

## 2020-06-01 VITALS
SYSTOLIC BLOOD PRESSURE: 130 MMHG | HEART RATE: 57 BPM | DIASTOLIC BLOOD PRESSURE: 70 MMHG | BODY MASS INDEX: 24.87 KG/M2 | RESPIRATION RATE: 16 BRPM | WEIGHT: 135.13 LBS | HEIGHT: 62 IN

## 2020-06-01 DIAGNOSIS — I10 ESSENTIAL HYPERTENSION WITH GOAL BLOOD PRESSURE LESS THAN 130/85: ICD-10-CM

## 2020-06-01 DIAGNOSIS — E11.65 TYPE 2 DIABETES MELLITUS WITH HYPERGLYCEMIA, WITHOUT LONG-TERM CURRENT USE OF INSULIN (HCC): ICD-10-CM

## 2020-06-01 DIAGNOSIS — J43.2 CENTRILOBULAR EMPHYSEMA (HCC): Primary | ICD-10-CM

## 2020-06-01 PROCEDURE — 99214 OFFICE O/P EST MOD 30 MIN: CPT | Performed by: INTERNAL MEDICINE

## 2020-06-01 PROCEDURE — G0463 HOSPITAL OUTPT CLINIC VISIT: HCPCS | Performed by: INTERNAL MEDICINE

## 2020-06-01 NOTE — PROGRESS NOTES
1. Chronic obstructive pulmonary disease with acute lower respiratory infection ([facility] [address]    Has been having less breathing issues. Was hospitalized with pneumonia. Feeling much better. Appetite good. Wears oxygen overnite. Nose gets quite dry at times. 04/17/2018 18   10/09/2016 20   09/26/2015 17   05/20/2014 23   08/22/2012 21     ALT (U/L)   Date Value   04/17/2018 29   10/09/2016 13 (L)   09/26/2015 15   05/20/2014 16   08/22/2012 17      No results found for: [address] [place] [address] denies any unusual skin lesions or rashes  RESPIRATORY: denies shortness of breath with exertion  CARDIOVASCULAR: denies chest pain on exertion  GI: denies abdominal pain and denies heartburn  NEURO: denies headaches    EXAM:   BP (!) 132/84 (BP Location: than 130/85    Patient instructed to take anti-hypertensive medicines exactly as prescribed and to follow a low salt diet as discussed.  Regular exercise at least 3 times weekly will help to curb one's appetite, control weight and lead to better blood press

## 2020-06-26 RX ORDER — FLUTICASONE FUROATE AND VILANTEROL TRIFENATATE 100; 25 UG/1; UG/1
POWDER RESPIRATORY (INHALATION)
Qty: 60 EACH | Refills: 0 | Status: SHIPPED | OUTPATIENT
Start: 2020-06-26 | End: 2021-03-15

## 2020-06-26 RX ORDER — FLUTICASONE FUROATE AND VILANTEROL TRIFENATATE 100; 25 UG/1; UG/1
POWDER RESPIRATORY (INHALATION)
Qty: 60 EACH | Refills: 0 | Status: SHIPPED | OUTPATIENT
Start: 2020-06-26 | End: 2021-09-17

## 2020-08-25 RX ORDER — LOSARTAN POTASSIUM 50 MG/1
TABLET ORAL
Qty: 90 TABLET | Refills: 3 | Status: SHIPPED | OUTPATIENT
Start: 2020-08-25 | End: 2020-12-09

## 2020-09-09 ENCOUNTER — OFFICE VISIT (OUTPATIENT)
Dept: INTERNAL MEDICINE CLINIC | Facility: CLINIC | Age: 80
End: 2020-09-09
Payer: MEDICARE

## 2020-09-09 VITALS
DIASTOLIC BLOOD PRESSURE: 72 MMHG | HEART RATE: 54 BPM | HEIGHT: 62 IN | WEIGHT: 136 LBS | SYSTOLIC BLOOD PRESSURE: 158 MMHG | BODY MASS INDEX: 25.03 KG/M2 | RESPIRATION RATE: 16 BRPM

## 2020-09-09 DIAGNOSIS — E78.2 MIXED HYPERLIPIDEMIA: ICD-10-CM

## 2020-09-09 DIAGNOSIS — B35.1 FUNGAL INFECTION OF NAIL: ICD-10-CM

## 2020-09-09 DIAGNOSIS — I10 ESSENTIAL HYPERTENSION WITH GOAL BLOOD PRESSURE LESS THAN 130/85: ICD-10-CM

## 2020-09-09 DIAGNOSIS — Z00.00 PHYSICAL EXAM, ANNUAL: Primary | ICD-10-CM

## 2020-09-09 DIAGNOSIS — M54.50 LUMBAR PAIN: ICD-10-CM

## 2020-09-09 DIAGNOSIS — J43.2 CENTRILOBULAR EMPHYSEMA (HCC): ICD-10-CM

## 2020-09-09 DIAGNOSIS — J96.12 CHRONIC RESPIRATORY FAILURE WITH HYPERCAPNIA (HCC): ICD-10-CM

## 2020-09-09 DIAGNOSIS — J43.9 PULMONARY EMPHYSEMA, UNSPECIFIED EMPHYSEMA TYPE (HCC): ICD-10-CM

## 2020-09-09 DIAGNOSIS — E11.65 TYPE 2 DIABETES MELLITUS WITH HYPERGLYCEMIA, WITHOUT LONG-TERM CURRENT USE OF INSULIN (HCC): ICD-10-CM

## 2020-09-09 PROBLEM — S82.892G CLOSED FRACTURE OF LEFT ANKLE WITH DELAYED HEALING: Status: RESOLVED | Noted: 2018-10-03 | Resolved: 2020-09-09

## 2020-09-09 PROBLEM — I82.409 ACUTE EMBOLISM AND THROMBOSIS OF DEEP VEIN OF LOWER EXTREMITY (HCC): Status: RESOLVED | Noted: 2020-02-24 | Resolved: 2020-09-09

## 2020-09-09 PROBLEM — Z23 NEED FOR VACCINATION: Status: RESOLVED | Noted: 2019-01-10 | Resolved: 2020-09-09

## 2020-09-09 PROBLEM — R79.89 ELEVATED TROPONIN: Status: RESOLVED | Noted: 2017-06-18 | Resolved: 2020-09-09

## 2020-09-09 PROBLEM — J21.1 ACUTE BRONCHIOLITIS DUE TO HUMAN METAPNEUMOVIRUS: Status: RESOLVED | Noted: 2018-04-08 | Resolved: 2020-09-09

## 2020-09-09 PROBLEM — J44.1 COPD EXACERBATION (HCC): Status: RESOLVED | Noted: 2018-04-08 | Resolved: 2020-09-09

## 2020-09-09 PROBLEM — J01.00 ACUTE MAXILLARY SINUSITIS: Status: RESOLVED | Noted: 2018-11-15 | Resolved: 2020-09-09

## 2020-09-09 PROBLEM — R77.8 ELEVATED TROPONIN: Status: RESOLVED | Noted: 2017-06-18 | Resolved: 2020-09-09

## 2020-09-09 PROCEDURE — 99214 OFFICE O/P EST MOD 30 MIN: CPT | Performed by: INTERNAL MEDICINE

## 2020-09-09 PROCEDURE — G0439 PPPS, SUBSEQ VISIT: HCPCS | Performed by: INTERNAL MEDICINE

## 2020-09-09 PROCEDURE — G0463 HOSPITAL OUTPT CLINIC VISIT: HCPCS | Performed by: INTERNAL MEDICINE

## 2020-09-09 NOTE — PROGRESS NOTES
1. Pulmonary emphysema, unspecified emphysema type ([facility] [address]    Has been having less breathing issues. Was hospitalized with pneumonia. Feeling much better. Appetite good. Wears oxygen overnite. Nose gets quite dry at times.      2. Type 2 diabetes mellitus w 20   09/26/2015 17   05/20/2014 23   08/22/2012 21     ALT (U/L)   Date Value   04/17/2018 29   10/09/2016 13 (L)   09/26/2015 15   05/20/2014 16   08/22/2012 17      No results found for: MICROALBCREA      Current Outpatient Medications:  BREO ELLIPTA 100 rashes  RESPIRATORY: denies shortness of breath with exertion  CARDIOVASCULAR: denies chest pain on exertion  GI: denies abdominal pain and denies heartburn  NEURO: denies headaches    EXAM:   BP (!) 132/84 (BP Location: Right arm, Patient Position: Sittin recheck of her diabetes. Diabetic control is much better  Have added amaryl 2 mg daily. Recommendations are: continue present meds, lose wgt with carbohydrate controlled diet and exercise, refer to Ophthalmology this month, check feet daily.     4. Justusenti discussed. Discussed lifestyle modifications including reductions in dietary total and saturated fat and eating a diet rich in fruits and vegetables.  Discussed decreasing alcohol consumption Try to exercise at least 3 times weekly to build strength, burn c

## 2020-10-02 RX ORDER — SITAGLIPTIN AND METFORMIN HYDROCHLORIDE 50; 1000 MG/1; MG/1
TABLET, FILM COATED ORAL
Qty: 180 TABLET | Refills: 3 | Status: SHIPPED | OUTPATIENT
Start: 2020-10-02

## 2020-11-03 RX ORDER — PRAVASTATIN SODIUM 20 MG
TABLET ORAL
Qty: 90 TABLET | Refills: 3 | Status: SHIPPED | OUTPATIENT
Start: 2020-11-03 | End: 2021-09-17

## 2020-11-06 ENCOUNTER — TELEPHONE (OUTPATIENT)
Dept: INTERNAL MEDICINE CLINIC | Facility: CLINIC | Age: 80
End: 2020-11-06

## 2020-11-06 DIAGNOSIS — E11.9 TYPE 2 DIABETES MELLITUS WITHOUT COMPLICATION, WITHOUT LONG-TERM CURRENT USE OF INSULIN (HCC): Primary | ICD-10-CM

## 2020-11-11 ENCOUNTER — TELEPHONE (OUTPATIENT)
Dept: INTERNAL MEDICINE CLINIC | Facility: CLINIC | Age: 80
End: 2020-11-11

## 2020-11-11 NOTE — TELEPHONE ENCOUNTER
JOAQUIN Walker  Pt stated that she is calling to informed us that she is moving and her  oxygen tank was picked up by [facility]. Pt stated that she did not use the oxygen tank for more then 1 year.  Pt wanted to make sure medicare will no longe

## 2020-12-09 ENCOUNTER — OFFICE VISIT (OUTPATIENT)
Dept: INTERNAL MEDICINE CLINIC | Facility: CLINIC | Age: 80
End: 2020-12-09
Payer: MEDICARE

## 2020-12-09 VITALS
SYSTOLIC BLOOD PRESSURE: 158 MMHG | BODY MASS INDEX: 24.48 KG/M2 | HEIGHT: 62 IN | WEIGHT: 133 LBS | RESPIRATION RATE: 16 BRPM | HEART RATE: 91 BPM | DIASTOLIC BLOOD PRESSURE: 72 MMHG

## 2020-12-09 DIAGNOSIS — J43.2 CENTRILOBULAR EMPHYSEMA (HCC): Primary | ICD-10-CM

## 2020-12-09 DIAGNOSIS — I10 ESSENTIAL HYPERTENSION WITH GOAL BLOOD PRESSURE LESS THAN 130/85: ICD-10-CM

## 2020-12-09 DIAGNOSIS — E11.9 TYPE 2 DIABETES MELLITUS WITHOUT COMPLICATION, WITHOUT LONG-TERM CURRENT USE OF INSULIN (HCC): ICD-10-CM

## 2020-12-09 PROCEDURE — 99214 OFFICE O/P EST MOD 30 MIN: CPT | Performed by: INTERNAL MEDICINE

## 2020-12-09 PROCEDURE — G0463 HOSPITAL OUTPT CLINIC VISIT: HCPCS | Performed by: INTERNAL MEDICINE

## 2020-12-09 RX ORDER — LOSARTAN POTASSIUM 100 MG/1
100 TABLET ORAL DAILY
Qty: 90 TABLET | Refills: 3 | Status: SHIPPED | OUTPATIENT
Start: 2020-12-09 | End: 2021-06-04

## 2020-12-09 RX ORDER — ALBUTEROL SULFATE 90 UG/1
2 AEROSOL, METERED RESPIRATORY (INHALATION) EVERY 4 HOURS PRN
Qty: 1 INHALER | Refills: 6 | Status: SHIPPED | OUTPATIENT
Start: 2020-12-09

## 2020-12-09 NOTE — PROGRESS NOTES
1. Centrilobular emphysema ([address]    Has been having less breathing issues. Was hospitalized with pneumonia. Feeling much better. Appetite good. Wears oxygen overnite. Nose gets quite dry at times.      2. Type 2 diabetes mellitus without complication, wit 05/20/2014 23   08/22/2012 21     ALT (U/L)   Date Value   04/17/2018 29   10/09/2016 13 (L)   09/26/2015 15   05/20/2014 16   08/22/2012 17      No results found for: MICROALBCREA      Current Outpatient Medications:  BREO ELLIPTA 100-25 MCG/INH Inhalat rashes  RESPIRATORY: denies shortness of breath with exertion  CARDIOVASCULAR: denies chest pain on exertion  GI: denies abdominal pain and denies heartburn  NEURO: denies headaches    EXAM:   BP (!) 132/84 (BP Location: Right arm, Patient Position: Sittin instructed to take anti-hypertensive medicines exactly as prescribed and to follow a low salt diet as discussed. Regular exercise at least 3 times weekly will help to curb one's appetite, control weight and lead to better blood pressure control.  Record blo

## 2021-01-27 DIAGNOSIS — Z23 NEED FOR VACCINATION: ICD-10-CM

## 2021-03-03 RX ORDER — DILTIAZEM HYDROCHLORIDE 240 MG/1
CAPSULE, COATED, EXTENDED RELEASE ORAL
Qty: 90 CAPSULE | Refills: 0 | Status: SHIPPED | OUTPATIENT
Start: 2021-03-03 | End: 2021-09-10

## 2021-03-15 RX ORDER — FLUTICASONE FUROATE AND VILANTEROL TRIFENATATE 100; 25 UG/1; UG/1
POWDER RESPIRATORY (INHALATION)
Qty: 60 EACH | Refills: 0 | Status: SHIPPED | OUTPATIENT
Start: 2021-03-15 | End: 2021-04-12

## 2021-04-12 RX ORDER — FLUTICASONE FUROATE AND VILANTEROL TRIFENATATE 100; 25 UG/1; UG/1
POWDER RESPIRATORY (INHALATION)
Qty: 60 EACH | Refills: 0 | Status: SHIPPED | OUTPATIENT
Start: 2021-04-12 | End: 2021-05-24

## 2021-04-22 ENCOUNTER — MED REC SCAN ONLY (OUTPATIENT)
Dept: INTERNAL MEDICINE CLINIC | Facility: CLINIC | Age: 81
End: 2021-04-22

## 2021-05-24 RX ORDER — FLUTICASONE FUROATE AND VILANTEROL TRIFENATATE 100; 25 UG/1; UG/1
POWDER RESPIRATORY (INHALATION)
Qty: 60 EACH | Refills: 0 | Status: SHIPPED | OUTPATIENT
Start: 2021-05-24 | End: 2021-09-17

## 2021-06-04 RX ORDER — LOSARTAN POTASSIUM 100 MG/1
100 TABLET ORAL DAILY
Qty: 90 TABLET | Refills: 3 | Status: SHIPPED | OUTPATIENT
Start: 2021-06-04 | End: 2022-06-04

## 2021-06-28 ENCOUNTER — TELEPHONE (OUTPATIENT)
Dept: INTERNAL MEDICINE CLINIC | Facility: CLINIC | Age: 81
End: 2021-06-28

## 2021-09-08 PROBLEM — I51.89 DIASTOLIC DYSFUNCTION: Status: ACTIVE | Noted: 2017-01-01

## 2021-09-10 ENCOUNTER — OFFICE VISIT (OUTPATIENT)
Dept: INTERNAL MEDICINE CLINIC | Facility: CLINIC | Age: 81
End: 2021-09-10
Payer: MEDICARE

## 2021-09-10 VITALS
SYSTOLIC BLOOD PRESSURE: 168 MMHG | WEIGHT: 135 LBS | BODY MASS INDEX: 24.84 KG/M2 | DIASTOLIC BLOOD PRESSURE: 62 MMHG | HEIGHT: 62 IN | HEART RATE: 74 BPM

## 2021-09-10 DIAGNOSIS — I10 ESSENTIAL HYPERTENSION: ICD-10-CM

## 2021-09-10 DIAGNOSIS — J43.2 CENTRILOBULAR EMPHYSEMA (HCC): ICD-10-CM

## 2021-09-10 DIAGNOSIS — E11.319 TYPE 2 DIABETES MELLITUS WITH RETINOPATHY, WITHOUT LONG-TERM CURRENT USE OF INSULIN, MACULAR EDEMA PRESENCE UNSPECIFIED, UNSPECIFIED LATERALITY, UNSPECIFIED RETINOPATHY SEVERITY (HCC): ICD-10-CM

## 2021-09-10 DIAGNOSIS — Z00.00 ANNUAL PHYSICAL EXAM: Primary | ICD-10-CM

## 2021-09-10 DIAGNOSIS — I70.0 AORTIC ATHEROSCLEROSIS (HCC): ICD-10-CM

## 2021-09-10 DIAGNOSIS — E78.5 DYSLIPIDEMIA ASSOCIATED WITH TYPE 2 DIABETES MELLITUS (HCC): ICD-10-CM

## 2021-09-10 DIAGNOSIS — Z00.00 ENCOUNTER FOR ANNUAL HEALTH EXAMINATION: ICD-10-CM

## 2021-09-10 DIAGNOSIS — E11.69 DYSLIPIDEMIA ASSOCIATED WITH TYPE 2 DIABETES MELLITUS (HCC): ICD-10-CM

## 2021-09-10 DIAGNOSIS — E11.21 DIABETIC NEPHROPATHY ASSOCIATED WITH TYPE 2 DIABETES MELLITUS (HCC): ICD-10-CM

## 2021-09-10 PROCEDURE — 99213 OFFICE O/P EST LOW 20 MIN: CPT | Performed by: INTERNAL MEDICINE

## 2021-09-10 PROCEDURE — G0439 PPPS, SUBSEQ VISIT: HCPCS | Performed by: INTERNAL MEDICINE

## 2021-09-10 RX ORDER — DILTIAZEM HYDROCHLORIDE 240 MG/1
CAPSULE, COATED, EXTENDED RELEASE ORAL
Qty: 90 CAPSULE | Refills: 0 | Status: SHIPPED | OUTPATIENT
Start: 2021-09-10

## 2021-09-10 NOTE — PROGRESS NOTES
HPI:   Mik Vizcaino is a 68year old female who presents this afternoon, accompanied by a family member, for a Medicare Subsequent Annual Wellness visit (Pt already had Initial Annual Wellness). Her previous PCP was Dr. Sean Fam who has retired.   She Pneumovax January 2019. Influenza vaccine September 2020. Has received 2 Covid vaccines (Maninder Nguyen).     Annual Physical due on 09/10/2022        Fall/Risk Assessment abnormal    She has been screened for Falls and is High Risk: Fall/Risk Scoring: Michael 180 : Yes       Depression Screening (PHQ-2/PHQ-9): Over the LAST 2 WEEKS   Little interest or pleasure in doing things: Not at all  Feeling down, depressed, or hopeless: Not at all  PHQ-2 SCORE: 0      Advanced Directive:  She does NOT have a Living Will on f lb (61.7 kg)     Last Cholesterol Labs:   Lab Results   Component Value Date    CHOLEST 134 06/18/2017    HDL 72 06/18/2017    LDL 51 06/18/2017    TRIG 56 06/18/2017          Last Chemistry Labs:   Lab Results   Component Value Date    AST 18 04/17/2018 reports that she quit smoking about 36 years ago. Her smoking use included cigarettes. She has a 20.00 pack-year smoking history. She has never used smokeless tobacco. She reports current alcohol use. No history on file for drug use.      REVIEW OF SYSTEMS: 1-2+ bilaterally and symmetric      Vaccination History     Immunization History   Administered Date(s) Administered   • FLU VAC High Dose 65 YRS & Older PRSV Free (67067) 10/19/2015, 11/10/2017, 09/20/2018, 09/15/2019   • HIGH DOSE FLU 65 YRS AND OLDER [place] follow-up in 1 month as above    Dyslipidemia associated with type 2 diabetes mellitus ([address]  Currently off statin therapy. Await labs. Follow-up in 1 month. -     LIPID PANEL;  Future    Centrilobular emphysema (HCC)  Severe by prior PFTs    Aortic athe 06/18/2017    TRIG 56 06/18/2017         Electrocardiogram (EKG)   Covered if needed at Welcome to Medicare, and non-screening if indicated for medical reasons 04/08/2018      Ultrasound Screening for Abdominal Aortic Aneurysm (AAA) Covered once in a lifet with your pharmacy prescription benefits -    Tetanus, Diptheria and Pertusis TD and TDaP Not covered by Medicare Part B -  No recommendations at this time    Zoster Not covered by Medicare Part B; may be covered with your pharmacy  prescription benefits -

## 2021-09-10 NOTE — PATIENT INSTRUCTIONS
Please come in soon for blood and urine testing. Continue current medications for now. Return visit in 1 month for a blood pressure check.   [address] SCREENING SCHEDULE   Tests on this list are recommended by your physician but may not be covered deficiency. Covered yearly for long-term glucocorticoid medication use (Steroids) No results found for this or any previous visit. DEXA Scan Never done   Pap and Pelvic    Pap   Covered every 2 years for women at normal risk;  Annually if at high ri Value Date    K 4.5 04/25/2018         Creatinine   Annually Lab Results   Component Value Date    CREATSERUM 0.51 04/25/2018         BUN Annually Lab Results   Component Value Date    BUN 18 04/25/2018       Drug Serum Conc Annually No results found for:

## 2021-09-12 ENCOUNTER — LAB ENCOUNTER (OUTPATIENT)
Dept: LAB | Facility: HOSPITAL | Age: 81
End: 2021-09-12
Attending: INTERNAL MEDICINE
Payer: MEDICARE

## 2021-09-12 DIAGNOSIS — E78.5 DYSLIPIDEMIA ASSOCIATED WITH TYPE 2 DIABETES MELLITUS (HCC): ICD-10-CM

## 2021-09-12 DIAGNOSIS — Z00.00 ANNUAL PHYSICAL EXAM: ICD-10-CM

## 2021-09-12 DIAGNOSIS — E11.319 TYPE 2 DIABETES MELLITUS WITH RETINOPATHY, WITHOUT LONG-TERM CURRENT USE OF INSULIN, MACULAR EDEMA PRESENCE UNSPECIFIED, UNSPECIFIED LATERALITY, UNSPECIFIED RETINOPATHY SEVERITY (HCC): ICD-10-CM

## 2021-09-12 DIAGNOSIS — E11.69 DYSLIPIDEMIA ASSOCIATED WITH TYPE 2 DIABETES MELLITUS (HCC): ICD-10-CM

## 2021-09-12 LAB
ALBUMIN SERPL-MCNC: 3.9 G/DL (ref 3.4–5)
ALBUMIN/GLOB SERPL: 1.1 {RATIO} (ref 1–2)
ALP LIVER SERPL-CCNC: 70 U/L
ALT SERPL-CCNC: 25 U/L
ANION GAP SERPL CALC-SCNC: 6 MMOL/L (ref 0–18)
AST SERPL-CCNC: 17 U/L (ref 15–37)
BILIRUB SERPL-MCNC: 0.4 MG/DL (ref 0.1–2)
BUN BLD-MCNC: 24 MG/DL (ref 7–18)
BUN/CREAT SERPL: 30.8 (ref 10–20)
CALCIUM BLD-MCNC: 9.7 MG/DL (ref 8.5–10.1)
CHLORIDE SERPL-SCNC: 108 MMOL/L (ref 98–112)
CHOLEST SMN-MCNC: 211 MG/DL (ref ?–200)
CO2 SERPL-SCNC: 28 MMOL/L (ref 21–32)
CREAT BLD-MCNC: 0.78 MG/DL
CREAT UR-SCNC: 95.9 MG/DL
DEPRECATED RDW RBC AUTO: 42.7 FL (ref 35.1–46.3)
ERYTHROCYTE [DISTWIDTH] IN BLOOD BY AUTOMATED COUNT: 12.2 % (ref 11–15)
EST. AVERAGE GLUCOSE BLD GHB EST-MCNC: 177 MG/DL (ref 68–126)
GLOBULIN PLAS-MCNC: 3.4 G/DL (ref 2.8–4.4)
GLUCOSE BLD-MCNC: 157 MG/DL (ref 70–99)
HBA1C MFR BLD HPLC: 7.8 % (ref ?–5.7)
HCT VFR BLD AUTO: 39.9 %
HDLC SERPL-MCNC: 60 MG/DL (ref 40–59)
HGB BLD-MCNC: 13.1 G/DL
LDLC SERPL CALC-MCNC: 134 MG/DL (ref ?–100)
M PROTEIN MFR SERPL ELPH: 7.3 G/DL (ref 6.4–8.2)
MCH RBC QN AUTO: 31.5 PG (ref 26–34)
MCHC RBC AUTO-ENTMCNC: 32.8 G/DL (ref 31–37)
MCV RBC AUTO: 95.9 FL
MICROALBUMIN UR-MCNC: 16.6 MG/DL
MICROALBUMIN/CREAT 24H UR-RTO: 173.1 UG/MG (ref ?–30)
NONHDLC SERPL-MCNC: 151 MG/DL (ref ?–130)
OSMOLALITY SERPL CALC.SUM OF ELEC: 301 MOSM/KG (ref 275–295)
PATIENT FASTING Y/N/NP: YES
PATIENT FASTING Y/N/NP: YES
PLATELET # BLD AUTO: 188 10(3)UL (ref 150–450)
POTASSIUM SERPL-SCNC: 4.4 MMOL/L (ref 3.5–5.1)
RBC # BLD AUTO: 4.16 X10(6)UL
SODIUM SERPL-SCNC: 142 MMOL/L (ref 136–145)
TRIGL SERPL-MCNC: 97 MG/DL (ref 30–149)
VLDLC SERPL CALC-MCNC: 18 MG/DL (ref 0–30)
WBC # BLD AUTO: 8 X10(3) UL (ref 4–11)

## 2021-09-12 PROCEDURE — 80053 COMPREHEN METABOLIC PANEL: CPT

## 2021-09-12 PROCEDURE — 36415 COLL VENOUS BLD VENIPUNCTURE: CPT

## 2021-09-12 PROCEDURE — 83036 HEMOGLOBIN GLYCOSYLATED A1C: CPT

## 2021-09-12 PROCEDURE — 82043 UR ALBUMIN QUANTITATIVE: CPT

## 2021-09-12 PROCEDURE — 85027 COMPLETE CBC AUTOMATED: CPT

## 2021-09-12 PROCEDURE — 82570 ASSAY OF URINE CREATININE: CPT

## 2021-09-12 PROCEDURE — 80061 LIPID PANEL: CPT

## 2021-09-17 RX ORDER — FLUTICASONE FUROATE AND VILANTEROL TRIFENATATE 100; 25 UG/1; UG/1
1 POWDER RESPIRATORY (INHALATION) DAILY
Qty: 60 EACH | Refills: 2 | Status: SHIPPED | OUTPATIENT
Start: 2021-09-17 | End: 2021-09-17

## 2021-09-17 RX ORDER — PRAVASTATIN SODIUM 20 MG
20 TABLET ORAL NIGHTLY
Qty: 90 TABLET | Refills: 1 | Status: SHIPPED | OUTPATIENT
Start: 2021-09-17

## 2021-09-17 RX ORDER — FLUTICASONE FUROATE AND VILANTEROL TRIFENATATE 100; 25 UG/1; UG/1
1 POWDER RESPIRATORY (INHALATION) DAILY
Qty: 60 EACH | Refills: 2 | Status: SHIPPED | OUTPATIENT
Start: 2021-09-17

## 2021-09-17 RX ORDER — PRAVASTATIN SODIUM 20 MG
20 TABLET ORAL NIGHTLY
Qty: 90 TABLET | Refills: 1 | Status: SHIPPED | OUTPATIENT
Start: 2021-09-17 | End: 2021-09-17

## 2021-09-17 NOTE — TELEPHONE ENCOUNTER
Refill passed per [place] [employer], [facility] protocol. Reviewed labs and recommendations with Pt regarding cholesterol medication. Per Dr. Keshawn Montero, restart pravastatin. Pt is out of medication.   Requested Prescriptions   Pending Prescriptions Disp Refills    [facility], [place], pt has not been seen since 2018, MyChart request

## 2021-09-17 NOTE — TELEPHONE ENCOUNTER
Pt inquiring about test results. Reviewed results with Pt. Pt states she does not have access to [facility] and stated she did not read Dr. Spicer Matter message. Pt mentioned her daughter has access to [facility]. Reviewed results with Pt.  She verbalized [place]

## 2021-09-17 NOTE — TELEPHONE ENCOUNTER
Pt mentioned, she was told during blood draw, Vitamin D will cost her $500 copay, that is why this was not drawn. Rx sent to [facility].

## 2022-01-01 RX ORDER — SITAGLIPTIN AND METFORMIN HYDROCHLORIDE 1000; 50 MG/1; MG/1
TABLET, FILM COATED ORAL
Qty: 180 TABLET | Refills: 0 | Status: SHIPPED | OUTPATIENT
Start: 2022-01-01 | End: 2022-01-01

## 2022-02-08 NOTE — TELEPHONE ENCOUNTER
Please review.  Protocol Failed or has no Protocol  Requested Prescriptions   Pending Prescriptions Disp Refills    SITagliptin-metFORMIN HCl (JANUMET)  MG Oral Tab 180 tablet 3     Sig: TAKE 1 TABLET BY MOUTH TWICE DAILY( EVERY TWELVE HOURS)        Diabetes Medication Protocol Failed - 2/8/2022  9:51 AM        Failed - Last A1C < 7.5 and within past 6 months     Lab Results   Component Value Date    A1C 7.8 (H) 09/12/2021               Passed - Appointment in past 6 or next 3 months        Passed - GFR Non- > 50     Lab Results   Component Value Date    GFRNAA 68 09/12/2021                 Passed - GFR in the past 12 months              Recent Outpatient Visits              5 months ago Annual physical exam    Ines Lerner MD    Office Visit    1 year ago Centrilobular emphysema [facility])    Rebecca Vazquez MD    Office Visit    1 year ago Physical exam, annual    Rebecca Vazquez MD    Office Visit    1 year ago Centrilobular emphysema [facility])    [facility], [facility], [place] Dyann Pollard, MD    Office Visit    1 year ago Centrilobular emphysema [facility])    [facility], [facility], [facility] [place] Dyann Pollard, MD    Office Visit

## 2022-02-15 RX ORDER — SITAGLIPTIN AND METFORMIN HYDROCHLORIDE 50; 1000 MG/1; MG/1
TABLET, FILM COATED ORAL
Qty: 180 TABLET | Refills: 1 | Status: SHIPPED | OUTPATIENT
Start: 2022-02-15

## 2022-02-15 NOTE — TELEPHONE ENCOUNTER
Please review. Protocol failed/ No protocol      Requested Prescriptions   Pending Prescriptions Disp Refills    SITagliptin-metFORMIN HCl (JANUMET)  MG Oral Tab 180 tablet 0     Sig: TAKE 1 TABLET BY MOUTH TWICE DAILY( EVERY TWELVE HOURS)        Diabetes Medication Protocol Failed - 2/14/2022  8:24 PM        Failed - Last A1C < 7.5 and within past 6 months     Lab Results   Component Value Date    A1C 7.8 (H) 09/12/2021               Passed - Appointment in past 6 or next 3 months        Passed - GFR Non- > 50     Lab Results   Component Value Date    GFRNAA 73 09/12/2021                 Passed - GFR in the past 12 months                   Recent Outpatient Visits              5 months ago Annual physical exam    Ant Marks MD    Office Visit    1 year ago Centrilobular emphysema [facility])    [address], [place] Karlyne Schwab, MD    Office Visit    1 year ago Physical exam, annual    [facility], [place] Karlyne Schwab, MD    Office Visit    1 year ago Centrilobular emphysema [facility])    [address], [address], [place] Dennis Walker MD    Office Visit    1 year ago Centrilobular emphysema [facility])    [address], [address], [facility] Dennis [facility]    Office Visit

## 2022-03-25 ENCOUNTER — TELEPHONE (OUTPATIENT)
Dept: INTERNAL MEDICINE CLINIC | Facility: CLINIC | Age: 82
End: 2022-03-25

## 2022-03-25 NOTE — TELEPHONE ENCOUNTER
Per Lise/nurse, she is faxing a current med list to [facility] of patient and doctor need to sign and date it and if ever needs to add any addition list of patient medication. Please fax it back to [phone].

## 2024-01-23 NOTE — TELEPHONE ENCOUNTER
JOAQUIN Chun I spoke with the patient and she just would like to give you best wishes for your assisted. Offered to help her schedule appt to establish care with another provider but she will call back later to do so. Thanks.
Per patient she don't know that Dr Blossom Dunbar is retiring and insist that she  would like to talk to him. No more info given.
No

## (undated) NOTE — IP AVS SNAPSHOT
Patient Demographics     Address  [address] [address] Phone  [phone] [facility]) *Preferred*  [phone] [facility])      Emergency Contact(s)     Name Relation Home Work [place] Daughter [phone]      Feliciano,Lillian Daughter 6 Fluticasone Furoate-Vilanterol 200-25 MCG/INH Aepb  Commonly known as:  BREO ELLIPTA  Start taking on:  4/17/2018  Next dose due: Tomorrow morning 4/17/18      Inhale 1 puff into the lungs daily.   Stop taking on:  5/17/2018   Royal Galeas MD         [address] [address]    Phone:  [phone]   AmLODIPine Besylate 5 MG Tabs  Fluticasone Furoate-Vilanterol 200-25 MCG/INH Aepb  Senna-Docusate Sodium 8.6-50 MG Tabs     Please  your prescriptions at the location d [id number] magnesium oxide (MAG-OX) tab 400 mg 04/16/18 0857 Given      [id number] predniSONE (DELTASONE) tab 40 mg 04/16/18 0857 Given            LEFT UPPER ARM     Order ID Medication Name Action Time Action Reason Comments    [id number] Insulin Aspart Pen BUN 19 8 - 20 mg/dL [facility]   Creatinine 0.48 0.50 - 1.50 mg/dL [facility]   Calcium, Total 9.3 8.5 - 10.5 mg/dL — [facility]   BUN/CREA Ratio 39.6 10.0 - 20.0 [facility]   Anion Gap 6 0 - 18 mmol/L — [facility]   Calculated Osmolality 2 04/08/18 1736    Order Status:  Completed Lab Status:  Final result Updated:  04/08/18 3310    Specimen:  Other from Nasopharyngeal swab      Adenovirus PCR: Negative     Coronavirus 229E PCR: Negative     Coronavirus Hku1 PCR: Negative     Coronavirus Nl6 She denies any associated chest pain palpitations fever or chills, claims her current symptoms feel like a typical COPD exacerbation which she has had in the past.[SA.2]    History:  Past Medical History:   Diagnosis Date   • Asthma 1948   • COPD ([address] 200 Pravastatin Sodium 20 MG Oral Tab   No No   Sig: Take 1 tablet (20 mg total) by mouth nightly. SITagliptin-MetFORMIN HCl (JANUMET)  MG Oral Tab   No No   Sig: Take 1 tablet by mouth 2 (two) times daily with meals.    lisinopril 20 MG Oral Tab   No Cognition and Speech:  Oriented, speech clear and coherent. Psychiatric:  Cooperative, appropriate mood & affect.       Laboratory Data:     Lab Results  Component Value Date   WBC 5.8 04/08/2018   HGB 12.7 04/08/2018   HCT 37.7 04/08/2018    04/08/ Consults signed by Roxie Valdes MD at 4/9/2018  3:57 PM     Author:  Roxie Valdes MD Service:  Pulmonology Author Type:  Physician    Filed:  4/9/2018  3:57 PM Date of Service:  4/9/2018  3:52 PM Status:  Signed    :  Roxie Valdes MD ([phone] 10 mg Nebulization Continuous   [COMPLETED] MethylPREDNISolone Sodium Succ (Solu-MEDROL) injection 60 mg 60 mg Intravenous Once   [COMPLETED] azithromycin (ZITHROMAX) tab 500 mg 500 mg Oral Once   Fluticasone Furoate-Vilanterol (BREO ELLIPTA) 200-25 MCG/IN Comment:soda/tablets - 2 cups daily           Family History:  Family History   Problem Relation Age of Onset   • Heart Disease Father      CAD (cause of death at age 77)   • Infectious Disease Mother      sepsis (cause of death at age 76)            [facility] Signed    :  Sun George MD (Physician)         [facility]    Discharge Summary    Fany Marroquin Patient Status:  Inpatient    1940 MRN [id number]   Location [address] [place] [address] Attending Sun George MD   Hosp Day Acute bronchiolitis due to human metapneumovirus     COPD exacerbation (HCC)[FG.3]        Physical Exam:   General appearance: alert, appears stated age and cooperative  Pulmonary:[FG.1]  Decreased air entry[FG.2]  Cardiovascular: S1, S2 normal, no murm needed hold metformin.    hba1c 6.7 at home  -ssi  -dc with  Home meds     Upper respiratory infection secondary to metapneumovirus  -Continue symptom care     Benign hypertension  -Blood pressure suboptimally controlled, will resume antihypertensives and e Stop taking on:  4/20/2018  Quantity:  9 tablet  Refills:  0     Senna-Docusate Sodium 8.6-50 MG Tabs  Commonly known as:  SENOKOT S      Take 2 tablets by mouth 2 (two) times daily.    Stop taking on:  5/16/2018  Quantity:  120 tablet  Refills:  0 or nurse    Bring a paper prescription for each of these medications  · guaiFENesin-codeine 100-10 MG/5ML Soln  · Hydrocod Polst-CPM Polst ER 10-8 MG/5ML Suer  · levofloxacin 750 MG Tabs  · predniSONE 20 MG Tabs         Follow up Visits: Follow-up with[FG. pacing. She is able to ambulate in halls 400' with RW and cues for breathing and pacing, SaO2 93% with HR 90. She is assisted into bathroom and reports she needs about 10 min.  Reinforced that she needs to call for help and call chain in her hand, she blake -   Moving to and from a bed to a chair (including a wheelchair)?: A Little   -   Need to walk in hospital room?: A Little   -   Climbing 3-5 steps with a railing?: A Little     AM-PAC Score:  Raw Score: 20   PT Approx Degree of Impairment Score: 35.83% Current Status  SBA 4/13/2018, requiring 3L O2 and cues for posture, pacing, use of RW, SaO2 93%      Goal #4 Patient will negotiate 18 stairs/one curb w/ assistive device and supervision   Goal #4   Current Status  NT 4/15/2018, patient reports she feels HHPT as plan at d/c to maximize functional independence and safety with mobilities at home    Needs to do stairs prior to home d/c.  Pt has at least 18 steps to enter home    DISCHARGE RECOMMENDATIONS[TF.1]  PT Discharge Recommendations: Home with home heal Assistive Device: Rolling walker  Pattern: Within Functional Limits  Stoop/Curb Assistance: Not tested[TF.2]         THERAPEUTIC EXERCISES  Lower Extremity Alternating marching  Ankle pumps  Hip AB/AD  Knee extension     Position Sitting[TF.1]       [place] S[facility] Note signed by LAN Louise at 4/16/2018  3:11 PM  Version 1 of 1    Author:  LAN Louise Service:  Rehab Author Type:  Speech and Language Pathologist    Filed:  4/16/2018  3:11 PM Date of Service:  4/16/2018  3:01 PM Status: liquids without overt signs or symptoms of aspiration with 100 % accuracy over 3 session(s). Intermittent coughing. Unable to rule out aspiration.   goal not met   Goal #2 The patient/family/caregiver will demonstrate understanding and implementation of

## (undated) NOTE — MR AVS SNAPSHOT
[address] [address]  [phone]               Thank you for choosing us for your health care visit with Zuleyka Francisco MD.  We are glad to serve you and happy to provide you with this s TAKE 1 TABLET BY MOUTH 2 TIMES DAILY WITH MEALS. lisinopril 20 MG Tabs   TAKE 1 TABLET BY MOUTH DAILY   Commonly known as:  PRINIVIL,ZESTRIL           Pravastatin Sodium 20 MG Tabs   TAKE 1 TABLET BY MOUTH ONCE DAILY.    Commonly known as:  PRAVAC

## (undated) NOTE — Clinical Note
JOAQUIN, TCM appt 5/2. TCM template completed. Pt want's to discuss being placed back on Lisinopril.

## (undated) NOTE — IP AVS SNAPSHOT
[facility]            (For Outpatient Use Only) Initial Admit Date: 4/8/2018   Inpt/Obs Admit Date: Inpt: 4/10/18 / Obs: 04/08/18   Discharge Date:    Karla Priest:  [de-identified]   MRN: [de-identified]   CSN: [id number]        ENCOUNTER  Patient C Hospital Account Financial Class: Medicare    April 16, 2018

## (undated) NOTE — MR AVS SNAPSHOT
[address] [address]  [phone]               Thank you for choosing us for your health care visit with Zuleyka Francisco MD.  We are glad to serve you and happy to provide you with this s MetFORMIN HCl 500 MG Tabs   TAKE 2 TABLETS BY MOUTH EVERY MORNING TAKE 2 TABLETS EVERY EVENING   Commonly known as:  GLUCOPHAGE           Pravastatin Sodium 20 MG Tabs   Take 1 tablet (20 mg total) by mouth once daily.    Commonly known as:  PRAVACHOL

## (undated) NOTE — ED AVS SNAPSHOT
Fiona Peralta   MRN: [id number]    Department:  [facility] Emergency Department   Date of Visit:  6/10/2018           Disclosure     Insurance plans vary and the physician(s) referred by the ER may not be covered by your plan.  Please contact within the next three months to obtain basic health screening including reassessment of your blood pressure.     IF THERE IS ANY CHANGE OR WORSENING OF YOUR CONDITION, CALL YOUR PRIMARY CARE PHYSICIAN AT ONCE OR RETURN IMMEDIATELY TO THE EMERGENCY DEPARTMEN

## (undated) NOTE — LETTER
12/14/2020              Verline Rosana        [address] [facility] [address]         Dear Lupe Ariza,    [facility] records indicate that the tests ordered for you by Amadeo Claros MD  have not been done.   If you have, in fact, already completed

## (undated) NOTE — MR AVS SNAPSHOT
[address] [address]  [phone]               Thank you for choosing us for your health care visit with Nikole Norman MD.  We are glad to serve you and happy to provide you with this s DilTIAZem HCl ER Coated Beads 240 MG Cp24   Take 1 capsule (240 mg total) by mouth once daily. What changed:  when to take this   Commonly known as:  CARTIA XT           lisinopril 20 MG Tabs   Take 1 tablet (20 mg total) by mouth once daily.    Commonly information, go to [email or web address] and click on the Sign Up Now link in the [employer] box. Enter your [employer] Activation Code exactly as it appears below along with your Zip Code and Date of Birth to complete the sign-up process.  If you do